# Patient Record
Sex: MALE | Race: BLACK OR AFRICAN AMERICAN | NOT HISPANIC OR LATINO | RURAL
[De-identification: names, ages, dates, MRNs, and addresses within clinical notes are randomized per-mention and may not be internally consistent; named-entity substitution may affect disease eponyms.]

---

## 2024-05-22 ENCOUNTER — HOSPITAL ENCOUNTER (EMERGENCY)
Facility: HOSPITAL | Age: 59
Discharge: HOME | End: 2024-05-22
Attending: EMERGENCY MEDICINE

## 2024-05-22 VITALS
OXYGEN SATURATION: 98 % | BODY MASS INDEX: 32.83 KG/M2 | WEIGHT: 264 LBS | HEART RATE: 93 BPM | HEIGHT: 75 IN | DIASTOLIC BLOOD PRESSURE: 89 MMHG | SYSTOLIC BLOOD PRESSURE: 128 MMHG | TEMPERATURE: 98 F | RESPIRATION RATE: 18 BRPM

## 2024-05-22 DIAGNOSIS — M62.838 MUSCLE SPASM: Primary | ICD-10-CM

## 2024-05-22 DIAGNOSIS — R52 PAIN: ICD-10-CM

## 2024-05-22 LAB
ALBUMIN SERPL BCP-MCNC: 3.7 G/DL (ref 3.5–5)
ALBUMIN/GLOB SERPL: 0.9 {RATIO}
ALP SERPL-CCNC: 150 U/L (ref 45–115)
ALT SERPL W P-5'-P-CCNC: 184 U/L (ref 16–61)
ANION GAP SERPL CALCULATED.3IONS-SCNC: 15 MMOL/L (ref 7–16)
AST SERPL W P-5'-P-CCNC: 76 U/L (ref 15–37)
BASOPHILS # BLD AUTO: 0.04 K/UL (ref 0–0.2)
BASOPHILS NFR BLD AUTO: 0.5 % (ref 0–1)
BASOPHILS NFR BLD MANUAL: 1 % (ref 0–1)
BILIRUB SERPL-MCNC: 0.5 MG/DL (ref ?–1.2)
BUN SERPL-MCNC: 15 MG/DL (ref 7–18)
BUN/CREAT SERPL: 12 (ref 6–20)
CALCIUM SERPL-MCNC: 9.6 MG/DL (ref 8.5–10.1)
CHLORIDE SERPL-SCNC: 103 MMOL/L (ref 98–107)
CO2 SERPL-SCNC: 25 MMOL/L (ref 21–32)
CREAT SERPL-MCNC: 1.24 MG/DL (ref 0.7–1.3)
DIFFERENTIAL METHOD BLD: ABNORMAL
EGFR (NO RACE VARIABLE) (RUSH/TITUS): 68 ML/MIN/1.73M2
EOSINOPHIL # BLD AUTO: 0.15 K/UL (ref 0–0.5)
EOSINOPHIL NFR BLD AUTO: 1.9 % (ref 1–4)
EOSINOPHIL NFR BLD MANUAL: 1 % (ref 1–4)
ERYTHROCYTE [DISTWIDTH] IN BLOOD BY AUTOMATED COUNT: 14.5 % (ref 11.5–14.5)
GLOBULIN SER-MCNC: 4.1 G/DL (ref 2–4)
GLUCOSE SERPL-MCNC: 283 MG/DL (ref 74–106)
HCT VFR BLD AUTO: 52.9 % (ref 40–54)
HGB BLD-MCNC: 16.5 G/DL (ref 13.5–18)
IMM GRANULOCYTES # BLD AUTO: 0.03 K/UL (ref 0–0.04)
IMM GRANULOCYTES NFR BLD: 0.4 % (ref 0–0.4)
LYMPHOCYTES # BLD AUTO: 2.21 K/UL (ref 1–4.8)
LYMPHOCYTES NFR BLD AUTO: 28.3 % (ref 27–41)
LYMPHOCYTES NFR BLD MANUAL: 30 % (ref 27–41)
MCH RBC QN AUTO: 25.2 PG (ref 27–31)
MCHC RBC AUTO-ENTMCNC: 31.2 G/DL (ref 32–36)
MCV RBC AUTO: 80.6 FL (ref 80–96)
MONOCYTES # BLD AUTO: 0.58 K/UL (ref 0–0.8)
MONOCYTES NFR BLD AUTO: 7.4 % (ref 2–6)
MONOCYTES NFR BLD MANUAL: 4 % (ref 2–6)
MPC BLD CALC-MCNC: ABNORMAL G/DL
NEUTROPHILS # BLD AUTO: 4.81 K/UL (ref 1.8–7.7)
NEUTROPHILS NFR BLD AUTO: 61.5 % (ref 53–65)
NEUTS BAND NFR BLD MANUAL: 1 % (ref 1–5)
NEUTS SEG NFR BLD MANUAL: 63 % (ref 50–62)
NRBC # BLD AUTO: 0 X10E3/UL
NRBC, AUTO (.00): 0 %
PLATELET # BLD AUTO: 124 K/UL (ref 150–400)
PLATELET MORPHOLOGY: ABNORMAL
POTASSIUM SERPL-SCNC: 4.6 MMOL/L (ref 3.5–5.1)
PROT SERPL-MCNC: 7.8 G/DL (ref 6.4–8.2)
RBC # BLD AUTO: 6.56 M/UL (ref 4.6–6.2)
RBC MORPH BLD: NORMAL
SODIUM SERPL-SCNC: 138 MMOL/L (ref 136–145)
WBC # BLD AUTO: 7.82 K/UL (ref 4.5–11)

## 2024-05-22 PROCEDURE — 36415 COLL VENOUS BLD VENIPUNCTURE: CPT | Performed by: EMERGENCY MEDICINE

## 2024-05-22 PROCEDURE — 99284 EMERGENCY DEPT VISIT MOD MDM: CPT | Mod: 25

## 2024-05-22 PROCEDURE — 80053 COMPREHEN METABOLIC PANEL: CPT | Performed by: EMERGENCY MEDICINE

## 2024-05-22 PROCEDURE — 85025 COMPLETE CBC W/AUTO DIFF WBC: CPT | Performed by: EMERGENCY MEDICINE

## 2024-05-22 RX ORDER — IBUPROFEN 600 MG/1
600 TABLET ORAL EVERY 6 HOURS PRN
Qty: 20 TABLET | Refills: 0 | Status: SHIPPED | OUTPATIENT
Start: 2024-05-22

## 2024-05-22 RX ORDER — CYCLOBENZAPRINE HCL 10 MG
10 TABLET ORAL 3 TIMES DAILY PRN
Qty: 30 TABLET | Refills: 0 | Status: SHIPPED | OUTPATIENT
Start: 2024-05-22 | End: 2024-06-01

## 2024-05-22 NOTE — Clinical Note
"Adam"Miguel Parsons was seen and treated in our emergency department on 5/22/2024.  He may return to work on 05/23/2024.       If you have any questions or concerns, please don't hesitate to call.      Jerson MORALES    "

## 2024-05-22 NOTE — ED PROVIDER NOTES
Encounter Date: 5/22/2024       History     Chief Complaint   Patient presents with    Cough     57 Y/O MALE WITH RIGHT CHEST WALL / RIB PAIN THAT STARTED AFTER A COUGHING EPISODE.  HE SAYS PAIN IS MODERATE, BUT IT IS WORSE WITH DEEP BREATHS, PALPATION, AND PARTICULAR MOVEMENTS.        Review of patient's allergies indicates:  No Known Allergies  History reviewed. No pertinent past medical history.  History reviewed. No pertinent surgical history.  No family history on file.  Social History     Tobacco Use    Smoking status: Every Day     Current packs/day: 1.00     Types: Cigarettes    Smokeless tobacco: Never   Substance Use Topics    Alcohol use: Never    Drug use: Never     Review of Systems   All other systems reviewed and are negative.      Physical Exam     Initial Vitals [05/22/24 0722]   BP Pulse Resp Temp SpO2   (!) 161/96 104 17 98.1 °F (36.7 °C) 98 %      MAP       --         Physical Exam    Nursing note and vitals reviewed.  Constitutional: He appears well-developed and well-nourished.   HENT:   Head: Normocephalic and atraumatic.   Nose: Nose normal.   Mouth/Throat: Oropharynx is clear and moist.   Eyes: Conjunctivae and EOM are normal. Pupils are equal, round, and reactive to light.   Neck: Neck supple.   Normal range of motion.  Cardiovascular:  Normal rate, regular rhythm and normal heart sounds.           Pulmonary/Chest: Breath sounds normal.   Abdominal: Abdomen is soft. Bowel sounds are normal.   Musculoskeletal:         General: Normal range of motion.      Cervical back: Normal range of motion and neck supple.     Neurological: He is alert and oriented to person, place, and time. He has normal strength. GCS score is 15. GCS eye subscore is 4. GCS verbal subscore is 5. GCS motor subscore is 6.   Skin: Skin is warm and dry. Capillary refill takes less than 2 seconds.   Psychiatric: He has a normal mood and affect.         Medical Screening Exam   See Full Note    ED Course   Procedures  Labs  Reviewed   COMPREHENSIVE METABOLIC PANEL - Abnormal; Notable for the following components:       Result Value    Glucose 283 (*)     Globulin 4.1 (*)     Alk Phos 150 (*)      (*)     AST 76 (*)     All other components within normal limits   CBC WITH DIFFERENTIAL - Abnormal; Notable for the following components:    RBC 6.56 (*)     MCH 25.2 (*)     MCHC 31.2 (*)     Platelet Count 124 (*)     Monocytes % 7.4 (*)     All other components within normal limits   MANUAL DIFFERENTIAL - Abnormal; Notable for the following components:    Segmented Neutrophils, Man % 63 (*)     Platelet Morphology Few Large Platelets (*)     All other components within normal limits   CBC W/ AUTO DIFFERENTIAL    Narrative:     The following orders were created for panel order CBC auto differential.  Procedure                               Abnormality         Status                     ---------                               -----------         ------                     CBC with Differential[[id number]]       Abnormal            Edited Result - FINAL      Manual Differential[[id number]]         Abnormal            Final result                 Please view results for these tests on the individual orders.          Imaging Results              X-Ray Chest AP Portable (Final result)  Result time 05/22/24 07:40:54      Final result by Daren Eaton II, MD (05/22/24 07:40:54)                   Impression:      No evidence of cardiopulmonary disease.      Electronically signed by: Daren Eaton  Date:    05/22/2024  Time:    07:40               Narrative:    EXAMINATION:  XR CHEST AP PORTABLE    CLINICAL HISTORY:  Pain, unspecified    COMPARISON:  None available    TECHNIQUE:  XR CHEST AP PORTABLE    FINDINGS:  The heart and mediastinum are normal in size and configuration.  The pulmonary vascularity is normal in caliber.  No lung infiltrates, effusions, pneumothorax or other abnormality is demonstrated.                                        Medications - No data to display  Medical Decision Making  Amount and/or Complexity of Data Reviewed  Labs: ordered.  Radiology: ordered.                                      Clinical Impression:   Final diagnoses:  [R52] Pain  [M62.838] Muscle spasm (Primary)        ED Disposition Condition    Discharge Stable          ED Prescriptions    None       Follow-up Information       Follow up With Specialties Details Why Contact Info    [facility] - Emergency Department Emergency Medicine  As needed [address] [place] [id number] [phone]             Grabiel Landa MD  05/22/24 2926

## 2024-08-01 ENCOUNTER — HOSPITAL ENCOUNTER (INPATIENT)
Facility: HOSPITAL | Age: 59
LOS: 1 days | Discharge: HOME | DRG: 322 | End: 2024-08-03
Attending: EMERGENCY MEDICINE | Admitting: INTERNAL MEDICINE

## 2024-08-01 DIAGNOSIS — Z79.4 TYPE 2 DIABETES MELLITUS WITH DIABETIC PERIPHERAL ANGIOPATHY WITHOUT GANGRENE, WITH LONG-TERM CURRENT USE OF INSULIN: ICD-10-CM

## 2024-08-01 DIAGNOSIS — R29.818 SUSPECTED SLEEP APNEA: ICD-10-CM

## 2024-08-01 DIAGNOSIS — I21.4 NSTEMI (NON-ST ELEVATED MYOCARDIAL INFARCTION): Primary | ICD-10-CM

## 2024-08-01 DIAGNOSIS — R07.9 CHEST PAIN: ICD-10-CM

## 2024-08-01 DIAGNOSIS — I73.9 PVD (PERIPHERAL VASCULAR DISEASE) WITH CLAUDICATION: ICD-10-CM

## 2024-08-01 DIAGNOSIS — F17.200 TOBACCO DEPENDENCY: ICD-10-CM

## 2024-08-01 DIAGNOSIS — E11.51 TYPE 2 DIABETES MELLITUS WITH DIABETIC PERIPHERAL ANGIOPATHY WITHOUT GANGRENE, WITH LONG-TERM CURRENT USE OF INSULIN: ICD-10-CM

## 2024-08-01 LAB
APTT PPP: 27.2 SECONDS (ref 25.2–37.3)
INR BLD: 1.06
PROTHROMBIN TIME: 13.7 SECONDS (ref 11.7–14.7)

## 2024-08-01 PROCEDURE — 93005 ELECTROCARDIOGRAM TRACING: CPT

## 2024-08-01 PROCEDURE — 99285 EMERGENCY DEPT VISIT HI MDM: CPT | Mod: 25

## 2024-08-01 PROCEDURE — 93010 ELECTROCARDIOGRAM REPORT: CPT | Mod: ,,, | Performed by: INTERNAL MEDICINE

## 2024-08-01 PROCEDURE — 36415 COLL VENOUS BLD VENIPUNCTURE: CPT | Performed by: EMERGENCY MEDICINE

## 2024-08-01 PROCEDURE — 82962 GLUCOSE BLOOD TEST: CPT

## 2024-08-01 PROCEDURE — 84484 ASSAY OF TROPONIN QUANT: CPT | Performed by: EMERGENCY MEDICINE

## 2024-08-01 PROCEDURE — 85025 COMPLETE CBC W/AUTO DIFF WBC: CPT | Performed by: EMERGENCY MEDICINE

## 2024-08-01 PROCEDURE — 83880 ASSAY OF NATRIURETIC PEPTIDE: CPT | Performed by: EMERGENCY MEDICINE

## 2024-08-01 PROCEDURE — 80053 COMPREHEN METABOLIC PANEL: CPT | Performed by: EMERGENCY MEDICINE

## 2024-08-01 PROCEDURE — 85730 THROMBOPLASTIN TIME PARTIAL: CPT | Performed by: EMERGENCY MEDICINE

## 2024-08-01 PROCEDURE — 85610 PROTHROMBIN TIME: CPT | Performed by: EMERGENCY MEDICINE

## 2024-08-01 PROCEDURE — 83735 ASSAY OF MAGNESIUM: CPT | Performed by: EMERGENCY MEDICINE

## 2024-08-01 PROCEDURE — 36415 COLL VENOUS BLD VENIPUNCTURE: CPT | Performed by: HOSPITALIST

## 2024-08-02 PROBLEM — E66.9 OBESITY (BMI 30-39.9): Status: ACTIVE | Noted: 2024-08-02

## 2024-08-02 PROBLEM — R73.9 HYPERGLYCEMIA: Status: ACTIVE | Noted: 2024-08-02

## 2024-08-02 PROBLEM — I21.4 NSTEMI (NON-ST ELEVATED MYOCARDIAL INFARCTION): Status: ACTIVE | Noted: 2024-08-02

## 2024-08-02 PROBLEM — F17.200 TOBACCO DEPENDENCY: Status: ACTIVE | Noted: 2024-08-02

## 2024-08-02 LAB
ABORH RETYPE: NORMAL
ALBUMIN SERPL BCP-MCNC: 3.7 G/DL (ref 3.5–5)
ALBUMIN/GLOB SERPL: 1 {RATIO}
ALP SERPL-CCNC: 157 U/L (ref 45–115)
ALT SERPL W P-5'-P-CCNC: 156 U/L (ref 16–61)
AMPHET UR QL SCN: NEGATIVE
ANION GAP SERPL CALCULATED.3IONS-SCNC: 12 MMOL/L (ref 7–16)
ANION GAP SERPL CALCULATED.3IONS-SCNC: 12 MMOL/L (ref 7–16)
AORTIC ROOT ANNULUS: 3.08 CM
AORTIC VALVE CUSP SEPERATION: 2.28 CM
APICAL FOUR CHAMBER EJECTION FRACTION: 62 %
AST SERPL W P-5'-P-CCNC: 97 U/L (ref 15–37)
AV INDEX (PROSTH): 0.6
AV MEAN GRADIENT: 3 MMHG
AV PEAK GRADIENT: 6 MMHG
AV VALVE AREA BY VELOCITY RATIO: 3.27 CM²
AV VALVE AREA: 2.47 CM²
AV VELOCITY RATIO: 0.79
BARBITURATES UR QL SCN: NEGATIVE
BASOPHILS # BLD AUTO: 0.03 K/UL (ref 0–0.2)
BASOPHILS NFR BLD AUTO: 0.7 % (ref 0–1)
BENZODIAZ METAB UR QL SCN: NEGATIVE
BILIRUB SERPL-MCNC: 1.7 MG/DL (ref ?–1.2)
BILIRUB UR QL STRIP: NEGATIVE
BSA FOR ECHO PROCEDURE: 2.45 M2
BUN SERPL-MCNC: 17 MG/DL (ref 7–18)
BUN SERPL-MCNC: 21 MG/DL (ref 7–18)
BUN/CREAT SERPL: 12 (ref 6–20)
BUN/CREAT SERPL: 16 (ref 6–20)
CALCIUM SERPL-MCNC: 9.2 MG/DL (ref 8.5–10.1)
CALCIUM SERPL-MCNC: 9.3 MG/DL (ref 8.5–10.1)
CANNABINOIDS UR QL SCN: NEGATIVE
CHLORIDE SERPL-SCNC: 107 MMOL/L (ref 98–107)
CHLORIDE SERPL-SCNC: 98 MMOL/L (ref 98–107)
CHOLEST SERPL-MCNC: 179 MG/DL (ref 0–200)
CHOLEST/HDLC SERPL: 9.4 {RATIO}
CLARITY UR: CLEAR
CO2 SERPL-SCNC: 21 MMOL/L (ref 21–32)
CO2 SERPL-SCNC: 24 MMOL/L (ref 21–32)
COCAINE UR QL SCN: NEGATIVE
COLOR UR: COLORLESS
CREAT SERPL-MCNC: 1.28 MG/DL (ref 0.7–1.3)
CREAT SERPL-MCNC: 1.42 MG/DL (ref 0.7–1.3)
CV ECHO LV RWT: 1.02 CM
D DIMER PPP FEU-MCNC: 0.36 ΜG/ML (ref 0–0.47)
DIFFERENTIAL METHOD BLD: ABNORMAL
DOP CALC AO PEAK VEL: 1.21 M/S
DOP CALC AO VTI: 23.2 CM
DOP CALC LVOT AREA: 4.1 CM2
DOP CALC LVOT DIAMETER: 2.29 CM
DOP CALC LVOT PEAK VEL: 0.96 M/S
DOP CALC LVOT STROKE VOLUME: 57.22 CM3
DOP CALCLVOT PEAK VEL VTI: 13.9 CM
E WAVE DECELERATION TIME: 133.68 MSEC
E/A RATIO: 0.68
E/E' RATIO: 6.63 M/S
ECHO LV POSTERIOR WALL: 1.78 CM (ref 0.6–1.1)
EGFR (NO RACE VARIABLE) (RUSH/TITUS): 58 ML/MIN/1.73M2
EGFR (NO RACE VARIABLE) (RUSH/TITUS): 66 ML/MIN/1.73M2
EJECTION FRACTION: 65 %
EOSINOPHIL # BLD AUTO: 0.05 K/UL (ref 0–0.5)
EOSINOPHIL NFR BLD AUTO: 1.1 % (ref 1–4)
ERYTHROCYTE [DISTWIDTH] IN BLOOD BY AUTOMATED COUNT: 13.6 % (ref 11.5–14.5)
EST. AVERAGE GLUCOSE BLD GHB EST-MCNC: 301 MG/DL
FRACTIONAL SHORTENING: 23 % (ref 28–44)
GLOBULIN SER-MCNC: 3.7 G/DL (ref 2–4)
GLUCOSE SERPL-MCNC: 204 MG/DL (ref 70–105)
GLUCOSE SERPL-MCNC: 222 MG/DL (ref 70–105)
GLUCOSE SERPL-MCNC: 335 MG/DL (ref 70–105)
GLUCOSE SERPL-MCNC: 360 MG/DL (ref 70–105)
GLUCOSE SERPL-MCNC: 417 MG/DL (ref 74–106)
GLUCOSE SERPL-MCNC: 584 MG/DL (ref 74–106)
GLUCOSE UR STRIP-MCNC: >1000 MG/DL
HBA1C MFR BLD HPLC: 12.1 % (ref 4.5–6.6)
HCT VFR BLD AUTO: 50.7 % (ref 40–54)
HDLC SERPL-MCNC: 19 MG/DL (ref 40–60)
HGB BLD-MCNC: 15.9 G/DL (ref 13.5–18)
IMM GRANULOCYTES # BLD AUTO: 0.02 K/UL (ref 0–0.04)
IMM GRANULOCYTES NFR BLD: 0.5 % (ref 0–0.4)
INDIRECT COOMBS: NORMAL
INTERVENTRICULAR SEPTUM: 1.89 CM (ref 0.6–1.1)
IVC DIAMETER: 1.44 CM
KETONES UR STRIP-SCNC: NEGATIVE MG/DL
LEFT ATRIUM AREA SYSTOLIC (APICAL 4 CHAMBER): 12.54 CM2
LEFT ATRIUM SIZE: 3.14 CM
LEFT INTERNAL DIMENSION IN SYSTOLE: 2.7 CM (ref 2.1–4)
LEFT VENTRICLE DIASTOLIC VOLUME INDEX: 20.58 ML/M2
LEFT VENTRICLE DIASTOLIC VOLUME: 49.59 ML
LEFT VENTRICLE END DIASTOLIC VOLUME APICAL 4 CHAMBER: 59.41 ML
LEFT VENTRICLE END SYSTOLIC VOLUME APICAL 4 CHAMBER: 28.02 ML
LEFT VENTRICLE MASS INDEX: 113 G/M2
LEFT VENTRICLE SYSTOLIC VOLUME INDEX: 11.2 ML/M2
LEFT VENTRICLE SYSTOLIC VOLUME: 26.9 ML
LEFT VENTRICULAR INTERNAL DIMENSION IN DIASTOLE: 3.5 CM (ref 3.5–6)
LEFT VENTRICULAR MASS: 271.6 G
LEUKOCYTE ESTERASE UR QL STRIP: NEGATIVE
LV LATERAL E/E' RATIO: 5.3 M/S
LV SEPTAL E/E' RATIO: 8.83 M/S
LVED V (TEICH): 49.59 ML
LVES V (TEICH): 26.9 ML
LVOT MG: 1.48 MMHG
LVOT MV: 0.56 CM/S
LYMPHOCYTES # BLD AUTO: 1.29 K/UL (ref 1–4.8)
LYMPHOCYTES NFR BLD AUTO: 29.6 % (ref 27–41)
MAGNESIUM SERPL-MCNC: 2.1 MG/DL (ref 1.7–2.3)
MCH RBC QN AUTO: 24.8 PG (ref 27–31)
MCHC RBC AUTO-ENTMCNC: 31.4 G/DL (ref 32–36)
MCV RBC AUTO: 79.2 FL (ref 80–96)
MONOCYTES # BLD AUTO: 0.42 K/UL (ref 0–0.8)
MONOCYTES NFR BLD AUTO: 9.6 % (ref 2–6)
MPC BLD CALC-MCNC: ABNORMAL G/DL
MV PEAK A VEL: 0.78 M/S
MV PEAK E VEL: 0.53 M/S
MV STENOSIS PRESSURE HALF TIME: 38.77 MS
MV VALVE AREA P 1/2 METHOD: 5.67 CM2
NEUTROPHILS # BLD AUTO: 2.55 K/UL (ref 1.8–7.7)
NEUTROPHILS NFR BLD AUTO: 58.5 % (ref 53–65)
NITRITE UR QL STRIP: NEGATIVE
NONHDLC SERPL-MCNC: 160 MG/DL
NRBC # BLD AUTO: 0 X10E3/UL
NRBC, AUTO (.00): 0 %
NT-PROBNP SERPL-MCNC: 36 PG/ML (ref 1–125)
OPIATES UR QL SCN: NEGATIVE
PCP UR QL SCN: NEGATIVE
PH UR STRIP: 5.5 PH UNITS
PLATELET # BLD AUTO: 127 K/UL (ref 150–400)
PLATELET MORPHOLOGY: ABNORMAL
POTASSIUM SERPL-SCNC: 4.1 MMOL/L (ref 3.5–5.1)
POTASSIUM SERPL-SCNC: 4.7 MMOL/L (ref 3.5–5.1)
PROT SERPL-MCNC: 7.4 G/DL (ref 6.4–8.2)
PROT UR QL STRIP: NEGATIVE
PV PEAK GRADIENT: 4 MMHG
PV PEAK VELOCITY: 1.03 M/S
RA MAJOR: 3.72 CM
RA PRESSURE ESTIMATED: 3 MMHG
RBC # BLD AUTO: 6.4 M/UL (ref 4.6–6.2)
RBC # UR STRIP: NEGATIVE /UL
RBC MORPH BLD: NORMAL
RH BLD: NORMAL
RIGHT VENTRICLE DIASTOLIC BASEL DIMENSION: 3.4 CM
RIGHT VENTRICLE DIASTOLIC LENGTH: 7.1 CM
RIGHT VENTRICLE DIASTOLIC MID DIMENSION: 2.3 CM
RIGHT VENTRICULAR LENGTH IN DIASTOLE (APICAL 4-CHAMBER VIEW): 7.09 CM
RV MID DIAMA: 2.32 CM
SARS-COV-2 RDRP RESP QL NAA+PROBE: NEGATIVE
SODIUM SERPL-SCNC: 130 MMOL/L (ref 136–145)
SODIUM SERPL-SCNC: 135 MMOL/L (ref 136–145)
SP GR UR STRIP: 1.03
SPECIMEN OUTDATE: NORMAL
TDI LATERAL: 0.1 M/S
TDI SEPTAL: 0.06 M/S
TDI: 0.08 M/S
TRICUSPID ANNULAR PLANE SYSTOLIC EXCURSION: 1.91 CM
TRIGL SERPL-MCNC: 833 MG/DL (ref 35–150)
TROPONIN I SERPL DL<=0.01 NG/ML-MCNC: 148.3 PG/ML
TROPONIN I SERPL DL<=0.01 NG/ML-MCNC: 42.1 PG/ML
TROPONIN I SERPL DL<=0.01 NG/ML-MCNC: 90.3 PG/ML
UROBILINOGEN UR STRIP-ACNC: NORMAL MG/DL
VLDLC SERPL-MCNC: ABNORMAL MG/DL
WBC # BLD AUTO: 4.36 K/UL (ref 4.5–11)
Z-SCORE OF LEFT VENTRICULAR DIMENSION IN END DIASTOLE: -11.62
Z-SCORE OF LEFT VENTRICULAR DIMENSION IN END SYSTOLE: -7.17

## 2024-08-02 PROCEDURE — 027034Z DILATION OF CORONARY ARTERY, ONE ARTERY WITH DRUG-ELUTING INTRALUMINAL DEVICE, PERCUTANEOUS APPROACH: ICD-10-PCS | Performed by: HOSPITALIST

## 2024-08-02 PROCEDURE — 25500020 PHARM REV CODE 255: Performed by: HOSPITALIST

## 2024-08-02 PROCEDURE — 25000003 PHARM REV CODE 250: Performed by: EMERGENCY MEDICINE

## 2024-08-02 PROCEDURE — 11000001 HC ACUTE MED/SURG PRIVATE ROOM

## 2024-08-02 PROCEDURE — 36415 COLL VENOUS BLD VENIPUNCTURE: CPT | Performed by: NURSE PRACTITIONER

## 2024-08-02 PROCEDURE — 86900 BLOOD TYPING SEROLOGIC ABO: CPT | Performed by: INTERNAL MEDICINE

## 2024-08-02 PROCEDURE — 25000003 PHARM REV CODE 250: Performed by: INTERNAL MEDICINE

## 2024-08-02 PROCEDURE — C1887 CATHETER, GUIDING: HCPCS | Performed by: HOSPITALIST

## 2024-08-02 PROCEDURE — 80061 LIPID PANEL: CPT | Performed by: INTERNAL MEDICINE

## 2024-08-02 PROCEDURE — 94761 N-INVAS EAR/PLS OXIMETRY MLT: CPT

## 2024-08-02 PROCEDURE — B2151ZZ FLUOROSCOPY OF LEFT HEART USING LOW OSMOLAR CONTRAST: ICD-10-PCS | Performed by: HOSPITALIST

## 2024-08-02 PROCEDURE — 84484 ASSAY OF TROPONIN QUANT: CPT | Performed by: EMERGENCY MEDICINE

## 2024-08-02 PROCEDURE — 96374 THER/PROPH/DIAG INJ IV PUSH: CPT

## 2024-08-02 PROCEDURE — C1769 GUIDE WIRE: HCPCS | Performed by: HOSPITALIST

## 2024-08-02 PROCEDURE — 36415 COLL VENOUS BLD VENIPUNCTURE: CPT | Performed by: INTERNAL MEDICINE

## 2024-08-02 PROCEDURE — 93005 ELECTROCARDIOGRAM TRACING: CPT

## 2024-08-02 PROCEDURE — 63600175 PHARM REV CODE 636 W HCPCS: Performed by: HOSPITALIST

## 2024-08-02 PROCEDURE — 80307 DRUG TEST PRSMV CHEM ANLYZR: CPT | Performed by: EMERGENCY MEDICINE

## 2024-08-02 PROCEDURE — 99900035 HC TECH TIME PER 15 MIN (STAT)

## 2024-08-02 PROCEDURE — 83036 HEMOGLOBIN GLYCOSYLATED A1C: CPT | Performed by: INTERNAL MEDICINE

## 2024-08-02 PROCEDURE — 87635 SARS-COV-2 COVID-19 AMP PRB: CPT | Performed by: INTERNAL MEDICINE

## 2024-08-02 PROCEDURE — 25000003 PHARM REV CODE 250: Performed by: HOSPITALIST

## 2024-08-02 PROCEDURE — 80048 BASIC METABOLIC PNL TOTAL CA: CPT | Performed by: INTERNAL MEDICINE

## 2024-08-02 PROCEDURE — 96361 HYDRATE IV INFUSION ADD-ON: CPT

## 2024-08-02 PROCEDURE — 85379 FIBRIN DEGRADATION QUANT: CPT | Performed by: NURSE PRACTITIONER

## 2024-08-02 PROCEDURE — 93010 ELECTROCARDIOGRAM REPORT: CPT | Mod: ,,, | Performed by: INTERNAL MEDICINE

## 2024-08-02 PROCEDURE — C1894 INTRO/SHEATH, NON-LASER: HCPCS | Performed by: HOSPITALIST

## 2024-08-02 PROCEDURE — 99153 MOD SED SAME PHYS/QHP EA: CPT | Performed by: HOSPITALIST

## 2024-08-02 PROCEDURE — C1874 STENT, COATED/COV W/DEL SYS: HCPCS | Performed by: HOSPITALIST

## 2024-08-02 PROCEDURE — 92928 PRQ TCAT PLMT NTRAC ST 1 LES: CPT | Mod: LD,,, | Performed by: HOSPITALIST

## 2024-08-02 PROCEDURE — 63600175 PHARM REV CODE 636 W HCPCS: Performed by: EMERGENCY MEDICINE

## 2024-08-02 PROCEDURE — 93458 L HRT ARTERY/VENTRICLE ANGIO: CPT | Mod: 26,XU,51, | Performed by: HOSPITALIST

## 2024-08-02 PROCEDURE — C1725 CATH, TRANSLUMIN NON-LASER: HCPCS | Performed by: HOSPITALIST

## 2024-08-02 PROCEDURE — 82962 GLUCOSE BLOOD TEST: CPT

## 2024-08-02 PROCEDURE — 4A023N7 MEASUREMENT OF CARDIAC SAMPLING AND PRESSURE, LEFT HEART, PERCUTANEOUS APPROACH: ICD-10-PCS | Performed by: HOSPITALIST

## 2024-08-02 PROCEDURE — C9600 PERC DRUG-EL COR STENT SING: HCPCS | Mod: LD | Performed by: HOSPITALIST

## 2024-08-02 PROCEDURE — 99152 MOD SED SAME PHYS/QHP 5/>YRS: CPT | Mod: ,,, | Performed by: HOSPITALIST

## 2024-08-02 PROCEDURE — B2111ZZ FLUOROSCOPY OF MULTIPLE CORONARY ARTERIES USING LOW OSMOLAR CONTRAST: ICD-10-PCS | Performed by: HOSPITALIST

## 2024-08-02 PROCEDURE — 63600175 PHARM REV CODE 636 W HCPCS: Performed by: INTERNAL MEDICINE

## 2024-08-02 PROCEDURE — 99233 SBSQ HOSP IP/OBS HIGH 50: CPT | Mod: ,,, | Performed by: HOSPITALIST

## 2024-08-02 PROCEDURE — 93458 L HRT ARTERY/VENTRICLE ANGIO: CPT | Mod: XU | Performed by: HOSPITALIST

## 2024-08-02 PROCEDURE — 36415 COLL VENOUS BLD VENIPUNCTURE: CPT | Performed by: EMERGENCY MEDICINE

## 2024-08-02 PROCEDURE — 99223 1ST HOSP IP/OBS HIGH 75: CPT | Mod: ,,, | Performed by: INTERNAL MEDICINE

## 2024-08-02 PROCEDURE — 99152 MOD SED SAME PHYS/QHP 5/>YRS: CPT | Performed by: HOSPITALIST

## 2024-08-02 PROCEDURE — 86850 RBC ANTIBODY SCREEN: CPT | Performed by: INTERNAL MEDICINE

## 2024-08-02 PROCEDURE — 81003 URINALYSIS AUTO W/O SCOPE: CPT | Mod: 59 | Performed by: EMERGENCY MEDICINE

## 2024-08-02 PROCEDURE — 27201423 OPTIME MED/SURG SUP & DEVICES STERILE SUPPLY: Performed by: HOSPITALIST

## 2024-08-02 PROCEDURE — 84484 ASSAY OF TROPONIN QUANT: CPT | Performed by: NURSE PRACTITIONER

## 2024-08-02 DEVICE — EVEROLIMUS-ELUTING PLATINUM CHROMIUM CORONARY STENT SYSTEM
Type: IMPLANTABLE DEVICE | Site: CORONARY | Status: FUNCTIONAL
Brand: SYNERGY™ XD

## 2024-08-02 RX ORDER — NICOTINE 7MG/24HR
1 PATCH, TRANSDERMAL 24 HOURS TRANSDERMAL DAILY
Status: DISCONTINUED | OUTPATIENT
Start: 2024-08-03 | End: 2024-08-03 | Stop reason: HOSPADM

## 2024-08-02 RX ORDER — PROCHLORPERAZINE EDISYLATE 5 MG/ML
5 INJECTION INTRAMUSCULAR; INTRAVENOUS EVERY 6 HOURS PRN
Status: DISCONTINUED | OUTPATIENT
Start: 2024-08-02 | End: 2024-08-03 | Stop reason: HOSPADM

## 2024-08-02 RX ORDER — GLUCAGON 1 MG
1 KIT INJECTION
Status: DISCONTINUED | OUTPATIENT
Start: 2024-08-02 | End: 2024-08-03 | Stop reason: HOSPADM

## 2024-08-02 RX ORDER — ATORVASTATIN CALCIUM 80 MG/1
80 TABLET, FILM COATED ORAL DAILY
Status: DISCONTINUED | OUTPATIENT
Start: 2024-08-02 | End: 2024-08-03 | Stop reason: HOSPADM

## 2024-08-02 RX ORDER — ASPIRIN 81 MG/1
81 TABLET ORAL DAILY
Status: DISCONTINUED | OUTPATIENT
Start: 2024-08-03 | End: 2024-08-03 | Stop reason: HOSPADM

## 2024-08-02 RX ORDER — ASPIRIN 325 MG
325 TABLET ORAL DAILY
Status: COMPLETED | OUTPATIENT
Start: 2024-08-02 | End: 2024-08-02

## 2024-08-02 RX ORDER — NITROGLYCERIN 0.4 MG/1
0.4 TABLET SUBLINGUAL EVERY 5 MIN PRN
Status: DISCONTINUED | OUTPATIENT
Start: 2024-08-02 | End: 2024-08-03 | Stop reason: HOSPADM

## 2024-08-02 RX ORDER — METOPROLOL TARTRATE 25 MG/1
12.5 TABLET ORAL 2 TIMES DAILY
Status: DISCONTINUED | OUTPATIENT
Start: 2024-08-02 | End: 2024-08-02

## 2024-08-02 RX ORDER — SODIUM CHLORIDE 0.9 % (FLUSH) 0.9 %
10 SYRINGE (ML) INJECTION
Status: DISCONTINUED | OUTPATIENT
Start: 2024-08-02 | End: 2024-08-03 | Stop reason: HOSPADM

## 2024-08-02 RX ORDER — LABETALOL HYDROCHLORIDE 5 MG/ML
10 INJECTION, SOLUTION INTRAVENOUS EVERY 4 HOURS PRN
Status: DISCONTINUED | OUTPATIENT
Start: 2024-08-02 | End: 2024-08-03 | Stop reason: HOSPADM

## 2024-08-02 RX ORDER — ACETAMINOPHEN 325 MG/1
650 TABLET ORAL EVERY 4 HOURS PRN
Status: DISCONTINUED | OUTPATIENT
Start: 2024-08-02 | End: 2024-08-03 | Stop reason: HOSPADM

## 2024-08-02 RX ORDER — LABETALOL HYDROCHLORIDE 5 MG/ML
INJECTION, SOLUTION INTRAVENOUS
Status: DISCONTINUED | OUTPATIENT
Start: 2024-08-02 | End: 2024-08-02 | Stop reason: HOSPADM

## 2024-08-02 RX ORDER — INSULIN ASPART 100 [IU]/ML
0-10 INJECTION, SOLUTION INTRAVENOUS; SUBCUTANEOUS EVERY 6 HOURS PRN
Status: DISCONTINUED | OUTPATIENT
Start: 2024-08-02 | End: 2024-08-03 | Stop reason: HOSPADM

## 2024-08-02 RX ORDER — LIDOCAINE HYDROCHLORIDE 10 MG/ML
INJECTION, SOLUTION INFILTRATION; PERINEURAL
Status: DISCONTINUED | OUTPATIENT
Start: 2024-08-02 | End: 2024-08-02 | Stop reason: HOSPADM

## 2024-08-02 RX ORDER — VERAPAMIL HYDROCHLORIDE 2.5 MG/ML
INJECTION, SOLUTION INTRAVENOUS
Status: DISCONTINUED | OUTPATIENT
Start: 2024-08-02 | End: 2024-08-02 | Stop reason: HOSPADM

## 2024-08-02 RX ORDER — SODIUM CHLORIDE 9 MG/ML
INJECTION, SOLUTION INTRAVENOUS CONTINUOUS
Status: ACTIVE | OUTPATIENT
Start: 2024-08-02 | End: 2024-08-02

## 2024-08-02 RX ORDER — IOPAMIDOL 755 MG/ML
INJECTION, SOLUTION INTRAVASCULAR
Status: DISCONTINUED | OUTPATIENT
Start: 2024-08-02 | End: 2024-08-02 | Stop reason: HOSPADM

## 2024-08-02 RX ORDER — MIDAZOLAM HYDROCHLORIDE 1 MG/ML
INJECTION INTRAMUSCULAR; INTRAVENOUS
Status: DISCONTINUED | OUTPATIENT
Start: 2024-08-02 | End: 2024-08-02 | Stop reason: HOSPADM

## 2024-08-02 RX ORDER — FENTANYL CITRATE 50 UG/ML
INJECTION, SOLUTION INTRAMUSCULAR; INTRAVENOUS
Status: DISCONTINUED | OUTPATIENT
Start: 2024-08-02 | End: 2024-08-02 | Stop reason: HOSPADM

## 2024-08-02 RX ORDER — LOSARTAN POTASSIUM 25 MG/1
25 TABLET ORAL DAILY
Status: DISCONTINUED | OUTPATIENT
Start: 2024-08-02 | End: 2024-08-03

## 2024-08-02 RX ORDER — SODIUM CHLORIDE 9 MG/ML
INJECTION, SOLUTION INTRAVENOUS CONTINUOUS
Status: DISCONTINUED | OUTPATIENT
Start: 2024-08-02 | End: 2024-08-02

## 2024-08-02 RX ORDER — HEPARIN SODIUM 5000 [USP'U]/ML
INJECTION, SOLUTION INTRAVENOUS; SUBCUTANEOUS
Status: DISCONTINUED | OUTPATIENT
Start: 2024-08-02 | End: 2024-08-02 | Stop reason: HOSPADM

## 2024-08-02 RX ORDER — ONDANSETRON HYDROCHLORIDE 2 MG/ML
4 INJECTION, SOLUTION INTRAVENOUS EVERY 8 HOURS PRN
Status: DISCONTINUED | OUTPATIENT
Start: 2024-08-02 | End: 2024-08-03 | Stop reason: HOSPADM

## 2024-08-02 RX ORDER — METOPROLOL TARTRATE 1 MG/ML
5 INJECTION, SOLUTION INTRAVENOUS
Status: COMPLETED | OUTPATIENT
Start: 2024-08-02 | End: 2024-08-02

## 2024-08-02 RX ORDER — NITROGLYCERIN 5 MG/ML
INJECTION, SOLUTION INTRAVENOUS
Status: DISCONTINUED | OUTPATIENT
Start: 2024-08-02 | End: 2024-08-02 | Stop reason: HOSPADM

## 2024-08-02 RX ORDER — HEPARIN SODIUM,PORCINE/D5W 25000/250
0-40 INTRAVENOUS SOLUTION INTRAVENOUS CONTINUOUS
Status: DISCONTINUED | OUTPATIENT
Start: 2024-08-02 | End: 2024-08-02

## 2024-08-02 RX ADMIN — HUMAN INSULIN 10 UNITS: 100 INJECTION, SOLUTION SUBCUTANEOUS at 02:08

## 2024-08-02 RX ADMIN — TICAGRELOR 90 MG: 90 TABLET ORAL at 09:08

## 2024-08-02 RX ADMIN — METOPROLOL TARTRATE 12.5 MG: 25 TABLET, FILM COATED ORAL at 08:08

## 2024-08-02 RX ADMIN — ATORVASTATIN CALCIUM 80 MG: 80 TABLET, FILM COATED ORAL at 08:08

## 2024-08-02 RX ADMIN — METOPROLOL TARTRATE 5 MG: 1 INJECTION, SOLUTION INTRAVENOUS at 12:08

## 2024-08-02 RX ADMIN — TICAGRELOR 180 MG: 90 TABLET ORAL at 06:08

## 2024-08-02 RX ADMIN — LOSARTAN POTASSIUM 25 MG: 25 TABLET, FILM COATED ORAL at 08:08

## 2024-08-02 RX ADMIN — ASPIRIN 325 MG ORAL TABLET 325 MG: 325 PILL ORAL at 08:08

## 2024-08-02 RX ADMIN — INSULIN ASPART 10 UNITS: 100 INJECTION, SOLUTION INTRAVENOUS; SUBCUTANEOUS at 06:08

## 2024-08-02 RX ADMIN — SODIUM CHLORIDE 1000 ML: 9 INJECTION, SOLUTION INTRAVENOUS at 06:08

## 2024-08-02 RX ADMIN — SODIUM CHLORIDE 1000 ML: 9 INJECTION, SOLUTION INTRAVENOUS at 02:08

## 2024-08-02 RX ADMIN — SODIUM CHLORIDE: 9 INJECTION, SOLUTION INTRAVENOUS at 06:08

## 2024-08-03 PROBLEM — I10 ESSENTIAL HYPERTENSION: Status: ACTIVE | Noted: 2024-08-03

## 2024-08-03 PROBLEM — E78.1 TYPE 2 DIABETES MELLITUS WITH HYPERTRIGLYCERIDEMIA: Status: ACTIVE | Noted: 2024-08-03

## 2024-08-03 PROBLEM — I73.9 PVD (PERIPHERAL VASCULAR DISEASE) WITH CLAUDICATION: Status: ACTIVE | Noted: 2024-08-03

## 2024-08-03 PROBLEM — Z79.4 TYPE 2 DIABETES MELLITUS WITH CIRCULATORY DISORDER, WITH LONG-TERM CURRENT USE OF INSULIN: Status: ACTIVE | Noted: 2024-08-03

## 2024-08-03 PROBLEM — R29.818 SUSPECTED SLEEP APNEA: Status: ACTIVE | Noted: 2024-08-03

## 2024-08-03 PROBLEM — E11.59 TYPE 2 DIABETES MELLITUS WITH CIRCULATORY DISORDER, WITH LONG-TERM CURRENT USE OF INSULIN: Status: ACTIVE | Noted: 2024-08-03

## 2024-08-03 PROBLEM — R73.9 HYPERGLYCEMIA: Status: RESOLVED | Noted: 2024-08-02 | Resolved: 2024-08-03

## 2024-08-03 PROBLEM — E11.69 TYPE 2 DIABETES MELLITUS WITH HYPERTRIGLYCERIDEMIA: Status: ACTIVE | Noted: 2024-08-03

## 2024-08-03 PROBLEM — E78.1 HYPERTRIGLYCERIDEMIA: Status: ACTIVE | Noted: 2024-08-03

## 2024-08-03 PROBLEM — E11.59 TYPE 2 DIABETES MELLITUS WITH CIRCULATORY DISORDER, WITHOUT LONG-TERM CURRENT USE OF INSULIN: Status: ACTIVE | Noted: 2024-08-03

## 2024-08-03 PROBLEM — M62.838 MUSCLE SPASM: Status: RESOLVED | Noted: 2024-05-22 | Resolved: 2024-08-03

## 2024-08-03 LAB
ANION GAP SERPL CALCULATED.3IONS-SCNC: 12 MMOL/L (ref 7–16)
BASOPHILS # BLD AUTO: 0.03 K/UL (ref 0–0.2)
BASOPHILS NFR BLD AUTO: 0.4 % (ref 0–1)
BUN SERPL-MCNC: 13 MG/DL (ref 7–18)
BUN/CREAT SERPL: 13 (ref 6–20)
CALCIUM SERPL-MCNC: 9.6 MG/DL (ref 8.5–10.1)
CHLORIDE SERPL-SCNC: 106 MMOL/L (ref 98–107)
CO2 SERPL-SCNC: 23 MMOL/L (ref 21–32)
CREAT SERPL-MCNC: 1.01 MG/DL (ref 0.7–1.3)
DIFFERENTIAL METHOD BLD: ABNORMAL
EGFR (NO RACE VARIABLE) (RUSH/TITUS): 87 ML/MIN/1.73M2
EOSINOPHIL # BLD AUTO: 0.08 K/UL (ref 0–0.5)
EOSINOPHIL NFR BLD AUTO: 1.2 % (ref 1–4)
ERYTHROCYTE [DISTWIDTH] IN BLOOD BY AUTOMATED COUNT: 13.4 % (ref 11.5–14.5)
GLUCOSE SERPL-MCNC: 221 MG/DL (ref 70–105)
GLUCOSE SERPL-MCNC: 227 MG/DL (ref 74–106)
GLUCOSE SERPL-MCNC: 247 MG/DL (ref 70–105)
GLUCOSE SERPL-MCNC: 264 MG/DL (ref 70–105)
HCT VFR BLD AUTO: 50 % (ref 40–54)
HGB BLD-MCNC: 15.7 G/DL (ref 13.5–18)
IMM GRANULOCYTES # BLD AUTO: 0.03 K/UL (ref 0–0.04)
IMM GRANULOCYTES NFR BLD: 0.4 % (ref 0–0.4)
LYMPHOCYTES # BLD AUTO: 1.8 K/UL (ref 1–4.8)
LYMPHOCYTES NFR BLD AUTO: 26.8 % (ref 27–41)
MCH RBC QN AUTO: 25 PG (ref 27–31)
MCHC RBC AUTO-ENTMCNC: 31.4 G/DL (ref 32–36)
MCV RBC AUTO: 79.6 FL (ref 80–96)
MONOCYTES # BLD AUTO: 0.58 K/UL (ref 0–0.8)
MONOCYTES NFR BLD AUTO: 8.6 % (ref 2–6)
MPC BLD CALC-MCNC: ABNORMAL G/DL
NEUTROPHILS # BLD AUTO: 4.19 K/UL (ref 1.8–7.7)
NEUTROPHILS NFR BLD AUTO: 62.6 % (ref 53–65)
NRBC # BLD AUTO: 0 X10E3/UL
NRBC, AUTO (.00): 0 %
PLATELET # BLD AUTO: 130 K/UL (ref 150–400)
PLATELET MORPHOLOGY: ABNORMAL
POTASSIUM SERPL-SCNC: 4 MMOL/L (ref 3.5–5.1)
RBC # BLD AUTO: 6.28 M/UL (ref 4.6–6.2)
RBC MORPH BLD: NORMAL
SODIUM SERPL-SCNC: 137 MMOL/L (ref 136–145)
WBC # BLD AUTO: 6.71 K/UL (ref 4.5–11)

## 2024-08-03 PROCEDURE — 99233 SBSQ HOSP IP/OBS HIGH 50: CPT | Mod: GT,,, | Performed by: INTERNAL MEDICINE

## 2024-08-03 PROCEDURE — 25000003 PHARM REV CODE 250: Performed by: HOSPITALIST

## 2024-08-03 PROCEDURE — 63600175 PHARM REV CODE 636 W HCPCS: Performed by: INTERNAL MEDICINE

## 2024-08-03 PROCEDURE — 94761 N-INVAS EAR/PLS OXIMETRY MLT: CPT

## 2024-08-03 PROCEDURE — 99239 HOSP IP/OBS DSCHRG MGMT >30: CPT | Mod: GC,,, | Performed by: HOSPITALIST

## 2024-08-03 PROCEDURE — 82962 GLUCOSE BLOOD TEST: CPT

## 2024-08-03 PROCEDURE — 25000003 PHARM REV CODE 250: Performed by: INTERNAL MEDICINE

## 2024-08-03 PROCEDURE — 80048 BASIC METABOLIC PNL TOTAL CA: CPT | Performed by: INTERNAL MEDICINE

## 2024-08-03 PROCEDURE — 25000242 PHARM REV CODE 250 ALT 637 W/ HCPCS: Performed by: HOSPITALIST

## 2024-08-03 PROCEDURE — 85025 COMPLETE CBC W/AUTO DIFF WBC: CPT | Performed by: INTERNAL MEDICINE

## 2024-08-03 PROCEDURE — 36415 COLL VENOUS BLD VENIPUNCTURE: CPT | Performed by: INTERNAL MEDICINE

## 2024-08-03 RX ORDER — LOSARTAN POTASSIUM 50 MG/1
50 TABLET ORAL DAILY
Status: DISCONTINUED | OUTPATIENT
Start: 2024-08-04 | End: 2024-08-03 | Stop reason: HOSPADM

## 2024-08-03 RX ORDER — HYDROCHLOROTHIAZIDE 25 MG/1
25 TABLET ORAL DAILY
Status: DISCONTINUED | OUTPATIENT
Start: 2024-08-03 | End: 2024-08-03 | Stop reason: HOSPADM

## 2024-08-03 RX ORDER — FENOFIBRATE 160 MG/1
160 TABLET ORAL DAILY
Status: DISCONTINUED | OUTPATIENT
Start: 2024-08-03 | End: 2024-08-03 | Stop reason: HOSPADM

## 2024-08-03 RX ORDER — ATORVASTATIN CALCIUM 40 MG/1
40 TABLET, FILM COATED ORAL DAILY
Qty: 90 TABLET | Refills: 1 | Status: SHIPPED | OUTPATIENT
Start: 2024-08-03 | End: 2025-08-03

## 2024-08-03 RX ORDER — GLIMEPIRIDE 2 MG/1
2 TABLET ORAL
Qty: 90 TABLET | Refills: 1 | Status: SHIPPED | OUTPATIENT
Start: 2024-08-04 | End: 2025-08-04

## 2024-08-03 RX ORDER — FENOFIBRATE 160 MG/1
160 TABLET ORAL DAILY
Qty: 90 TABLET | Refills: 1 | Status: SHIPPED | OUTPATIENT
Start: 2024-08-04 | End: 2025-08-04

## 2024-08-03 RX ORDER — GLIMEPIRIDE 2 MG/1
2 TABLET ORAL
Status: DISCONTINUED | OUTPATIENT
Start: 2024-08-03 | End: 2024-08-03 | Stop reason: HOSPADM

## 2024-08-03 RX ORDER — HYDROCHLOROTHIAZIDE 25 MG/1
25 TABLET ORAL DAILY
Qty: 30 TABLET | Refills: 5 | Status: SHIPPED | OUTPATIENT
Start: 2024-08-04 | End: 2025-08-04

## 2024-08-03 RX ORDER — LOSARTAN POTASSIUM 50 MG/1
50 TABLET ORAL DAILY
Qty: 90 TABLET | Refills: 1 | Status: SHIPPED | OUTPATIENT
Start: 2024-08-04 | End: 2025-08-04

## 2024-08-03 RX ORDER — NICOTINE 7MG/24HR
1 PATCH, TRANSDERMAL 24 HOURS TRANSDERMAL DAILY
Qty: 30 PATCH | Refills: 1 | Status: SHIPPED | OUTPATIENT
Start: 2024-08-03

## 2024-08-03 RX ORDER — METOPROLOL SUCCINATE 25 MG/1
12.5 TABLET, EXTENDED RELEASE ORAL DAILY
Qty: 45 TABLET | Refills: 1 | Status: SHIPPED | OUTPATIENT
Start: 2024-08-04 | End: 2025-08-04

## 2024-08-03 RX ORDER — ASPIRIN 81 MG/1
81 TABLET ORAL DAILY
Qty: 30 TABLET | Refills: 5 | Status: SHIPPED | OUTPATIENT
Start: 2024-08-04 | End: 2025-08-04

## 2024-08-03 RX ORDER — FAMOTIDINE 20 MG/1
20 TABLET, FILM COATED ORAL 2 TIMES DAILY
Status: DISCONTINUED | OUTPATIENT
Start: 2024-08-03 | End: 2024-08-03 | Stop reason: HOSPADM

## 2024-08-03 RX ADMIN — ATORVASTATIN CALCIUM 80 MG: 80 TABLET, FILM COATED ORAL at 08:08

## 2024-08-03 RX ADMIN — LOSARTAN POTASSIUM 25 MG: 25 TABLET, FILM COATED ORAL at 08:08

## 2024-08-03 RX ADMIN — GLIMEPIRIDE 2 MG: 2 TABLET ORAL at 10:08

## 2024-08-03 RX ADMIN — ASPIRIN 81 MG: 81 TABLET, COATED ORAL at 08:08

## 2024-08-03 RX ADMIN — INSULIN ASPART 4 UNITS: 100 INJECTION, SOLUTION INTRAVENOUS; SUBCUTANEOUS at 06:08

## 2024-08-03 RX ADMIN — LABETALOL HYDROCHLORIDE 10 MG: 5 INJECTION, SOLUTION INTRAVENOUS at 02:08

## 2024-08-03 RX ADMIN — METOPROLOL SUCCINATE 12.5 MG: 25 TABLET, EXTENDED RELEASE ORAL at 08:08

## 2024-08-03 RX ADMIN — ONDANSETRON 4 MG: 2 INJECTION INTRAMUSCULAR; INTRAVENOUS at 08:08

## 2024-08-03 RX ADMIN — HYDROCHLOROTHIAZIDE 25 MG: 25 TABLET ORAL at 10:08

## 2024-08-03 RX ADMIN — FAMOTIDINE 20 MG: 20 TABLET, FILM COATED ORAL at 10:08

## 2024-08-03 RX ADMIN — TICAGRELOR 90 MG: 90 TABLET ORAL at 08:08

## 2024-08-03 RX ADMIN — FENOFIBRATE 160 MG: 160 TABLET ORAL at 10:08

## 2024-08-04 VITALS
SYSTOLIC BLOOD PRESSURE: 171 MMHG | WEIGHT: 250 LBS | BODY MASS INDEX: 31.08 KG/M2 | OXYGEN SATURATION: 99 % | HEART RATE: 109 BPM | DIASTOLIC BLOOD PRESSURE: 95 MMHG | HEIGHT: 75 IN | TEMPERATURE: 98 F | RESPIRATION RATE: 16 BRPM

## 2024-08-06 LAB
GLUCOSE SERPL-MCNC: 483 MG/DL (ref 70–105)
OHS QRS DURATION: 76 MS
OHS QRS DURATION: 80 MS
OHS QTC CALCULATION: 389 MS
OHS QTC CALCULATION: 412 MS

## 2024-08-07 LAB — POC ACTIVATED CLOTTING TIME K: 345 SEC

## 2024-08-12 ENCOUNTER — OFFICE VISIT (OUTPATIENT)
Dept: FAMILY MEDICINE | Facility: CLINIC | Age: 59
End: 2024-08-12

## 2024-08-12 VITALS
WEIGHT: 250 LBS | BODY MASS INDEX: 31.08 KG/M2 | HEIGHT: 75 IN | DIASTOLIC BLOOD PRESSURE: 76 MMHG | OXYGEN SATURATION: 98 % | RESPIRATION RATE: 16 BRPM | TEMPERATURE: 98 F | SYSTOLIC BLOOD PRESSURE: 139 MMHG | HEART RATE: 89 BPM

## 2024-08-12 DIAGNOSIS — H53.8 BLURRY VISION, BILATERAL: Primary | ICD-10-CM

## 2024-08-12 DIAGNOSIS — E11.65 TYPE 2 DIABETES MELLITUS WITH HYPERGLYCEMIA, UNSPECIFIED WHETHER LONG TERM INSULIN USE: ICD-10-CM

## 2024-08-12 LAB — GLUCOSE SERPL-MCNC: 240 MG/DL (ref 70–110)

## 2024-08-12 NOTE — ASSESSMENT & PLAN NOTE
-Pt c/o 3 week Hx of blurry vision  -Pt states that he checks his glucose levels daily and it fluctuates.  -In-clinic Acu chek today- showed glucose of 240. Pt was advised to  increase his dosage of Glimepiride from one(2 mg) tablet daily to 1 and 1/2 tablet daily. Monitor morning glucose readings and f/u in 2 weeks.  -Pt was referred to opthalmology- Pt states he has blurry vision even when his glucose levels are within normal range.

## 2024-08-12 NOTE — PROGRESS NOTES
Marycarmen Hendricks MD    [address], [place] [address]  Phone: [phone]     Subjective     Name: Adam Parsons   YOB: 1965 (58 y.o.)  MRN: [id number]  Visit Date: 8/12/2024   Chief Complaint: ER F/U (NSTEMI, S/P LEFT HEART CATH 08.02.24), Hypertension (DX IN ER), Diabetes (DX IN ER), and Hyperlipidemia (DX IN ER)        HISTORY OF PRESENT ILLNESS:  HPI  Mr. Parsons is a 59 yo smoker who presents for hospital f/u  from  a Left heart cath on 8/2/2024. At that visit he was diagnosed with essential HTN, PVD, Hyperglyceridemia, Type 2 DM and suspected sleep apnea. Today Mr. Parsons is feeling well. He denies CP, SOB, edema, HA, or dizziness. He states that a week prior to his MI he started experiencing blurry vision. He has an appointment with [facility] next week. He is compliant with his medications and has not smoked since.     PAST MEDICAL HISTORY:  Significant Diagnoses - Patient  has a past medical history of Diabetes mellitus, type 2, Hyperlipidemia, Hypertension, and NSTEMI (non-ST elevated myocardial infarction) (08/02/2024).  Medications - Patient has a current medication list which includes the following long-term medication(s): aspirin, atorvastatin, fenofibrate, glimepiride, hydrochlorothiazide, losartan, metoprolol succinate, and ticagrelor.   Allergies - Patient has No Known Allergies.  Surgeries - Patient  has a past surgical history that includes ANGIOGRAM, CORONARY, WITH LEFT HEART CATHETERIZATION (N/A, 8/2/2024) and percutaneous coronary intervention, artery (N/A, 8/2/2024).  Family History - Patient family history includes Diabetes in his daughter and maternal uncle; Heart disease in his daughter and father.      SOCIAL HISTORY:  Tobacco - Patient  reports that he has quit smoking. His smoking use included cigarettes. He has never used smokeless tobacco.   Alcohol - Patient  reports that he does not currently use alcohol.   Recreational Drugs - Patient  reports no history  "of drug use.       Review of Systems   Constitutional:  Negative for diaphoresis.   Cardiovascular:  Negative for chest pain.   Musculoskeletal:  Negative for joint swelling.   Neurological:  Negative for dizziness and headaches.          Past Medical History:   Diagnosis Date    Diabetes mellitus, type 2     Hyperlipidemia     Hypertension     NSTEMI (non-ST elevated myocardial infarction) 08/02/2024        Review of patient's allergies indicates:  No Known Allergies     Past Surgical History:   Procedure Laterality Date    ANGIOGRAM, CORONARY, WITH LEFT HEART CATHETERIZATION N/A 8/2/2024    Procedure: Angiogram, Coronary, with Left Heart Cath;  Surgeon: Blake Hodgson MD;  Location: [facility] CATH LAB;  Service: Cardiology;  Laterality: N/A;    PERCUTANEOUS CORONARY INTERVENTION, ARTERY N/A 8/2/2024    Procedure: Percutaneous coronary intervention;  Surgeon: Blake Hodgson MD;  Location: [facility] CATH LAB;  Service: Cardiology;  Laterality: N/A;        Family History   Problem Relation Name Age of Onset    Heart disease Father      Diabetes Daughter      Heart disease Daughter      Diabetes Maternal Uncle         Current Outpatient Medications   Medication Instructions    aspirin (ECOTRIN) 81 mg, Oral, Daily    atorvastatin (LIPITOR) 40 mg, Oral, Daily    fenofibrate 160 mg, Oral, Daily    glimepiride (AMARYL) 2 mg, Oral, With breakfast    hydroCHLOROthiazide (HYDRODIURIL) 25 mg, Oral, Daily    losartan (COZAAR) 50 mg, Oral, Daily    metoprolol succinate (TOPROL-XL) 12.5 mg, Oral, Daily    nicotine (NICODERM CQ) 7 mg/24 hr 1 patch, Transdermal, Daily    ticagrelor (BRILINTA) 90 mg, Oral, 2 times daily        Objective     /76 (BP Location: Left arm, Patient Position: Sitting, BP Method: Large (Automatic))   Pulse 89   Temp 98.3 °F (36.8 °C) (Oral)   Resp 16   Ht 6' 3" (1.905 m)   Wt 113.4 kg (250 lb)   SpO2 98%   BMI 31.25 kg/m²     Physical Exam  Constitutional:       Appearance: Normal " appearance.   Cardiovascular:      Rate and Rhythm: Normal rate and regular rhythm.      Heart sounds: Normal heart sounds.   Pulmonary:      Effort: Pulmonary effort is normal.      Breath sounds: Normal breath sounds.   Musculoskeletal:         General: No swelling.   Neurological:      General: No focal deficit present.      Mental Status: He is alert.   Psychiatric:         Mood and Affect: Mood normal.          All recently obtained labs have been reviewed and discussed in detail with the patient.   Assessment     1. Blurry vision, bilateral    2. Type 2 diabetes mellitus with hyperglycemia, unspecified whether long term insulin use         Plan     Problem List Items Addressed This Visit          Ophtho    Blurry vision, bilateral - Primary     -Pt c/o 3 week Hx of blurry vision  -Pt states that he checks his glucose levels daily and it fluctuates.  -In-clinic Acu chek today- showed glucose of 240. Pt was advised to  increase his dosage of Glimepiride from one(2 mg) tablet daily to 1 and 1/2 tablet daily. Monitor morning glucose readings and f/u in 2 weeks.  -Pt was referred to opthalmology- Pt states he has blurry vision even when his glucose levels are within normal range.            Relevant Orders    Ambulatory referral/consult to Ophthalmology     Other Visit Diagnoses       Type 2 diabetes mellitus with hyperglycemia, unspecified whether long term insulin use        Relevant Orders    POCT glucose (Completed)              All orders:  Orders Placed This Encounter    Ambulatory referral/consult to Ophthalmology    POCT glucose          Follow up in about 2 weeks (around 8/26/2024).    Instructed patient that if symptoms fail to improve or worsen patient should seek immediate medical attention or report to the nearest emergency department. Patient expressed verbal agreement and understanding to this plan of care.   Marycarmen Hendricks MD  Family Medicine Residency PGY-1  [facility]

## 2024-08-20 ENCOUNTER — TELEPHONE (OUTPATIENT)
Dept: CARDIOLOGY | Facility: CLINIC | Age: 59
End: 2024-08-20

## 2024-08-20 ENCOUNTER — OFFICE VISIT (OUTPATIENT)
Dept: CARDIOLOGY | Facility: CLINIC | Age: 59
End: 2024-08-20

## 2024-08-20 VITALS
HEART RATE: 105 BPM | WEIGHT: 236 LBS | DIASTOLIC BLOOD PRESSURE: 70 MMHG | OXYGEN SATURATION: 98 % | HEIGHT: 75 IN | SYSTOLIC BLOOD PRESSURE: 100 MMHG | BODY MASS INDEX: 29.34 KG/M2

## 2024-08-20 DIAGNOSIS — I21.4 NSTEMI (NON-ST ELEVATED MYOCARDIAL INFARCTION): ICD-10-CM

## 2024-08-20 DIAGNOSIS — F17.200 TOBACCO DEPENDENCY: ICD-10-CM

## 2024-08-20 DIAGNOSIS — E11.51 TYPE 2 DIABETES MELLITUS WITH DIABETIC PERIPHERAL ANGIOPATHY WITHOUT GANGRENE, WITHOUT LONG-TERM CURRENT USE OF INSULIN: ICD-10-CM

## 2024-08-20 DIAGNOSIS — I10 ESSENTIAL HYPERTENSION: Primary | ICD-10-CM

## 2024-08-20 DIAGNOSIS — I73.9 PVD (PERIPHERAL VASCULAR DISEASE) WITH CLAUDICATION: ICD-10-CM

## 2024-08-20 PROCEDURE — 99214 OFFICE O/P EST MOD 30 MIN: CPT | Mod: PBBFAC | Performed by: NURSE PRACTITIONER

## 2024-08-20 PROCEDURE — 99214 OFFICE O/P EST MOD 30 MIN: CPT | Mod: S$PBB,,, | Performed by: NURSE PRACTITIONER

## 2024-08-20 PROCEDURE — 99999 PR PBB SHADOW E&M-EST. PATIENT-LVL IV: CPT | Mod: PBBFAC,,, | Performed by: NURSE PRACTITIONER

## 2024-08-20 NOTE — PROGRESS NOTES
PCP: Mary, Primary Doctor    Referring Provider:     Subjective:   Adam Parsons is a 58 y.o. male with no PMHx who presents for hospital discharge follow up.     Pt was recently hospitalized here at [facility] for NSTEMI s/p PCI to LAD. Echo showed LVEF 65% with no significant RWMA and no significant valvular abnormalities. His lipid panel revealed elevated triglycerides of 833, LDL was unable to be measured, HDL was 19. He was discharged on DAPT with ASA and Brilinta along with losartan, metoprolol, HCTZ, and high intensity statin. Pt was also diagnosed with DM2 during this admission. His A1C was 12. He was started on Amaryl.    Today, pt denies any chest pain, SOB or any other cardiac symptoms. His wife is with him today and has pt's BP log. BP has been low normal for the last 4-5 days with two hypotensive readings (80s/40s). Pt states he felt fatigued with those readings. Pt's wife states she has the entire family on a low sodium, low cholesterol diet and they are walking daily for exercise. Pt has lost approximately 14 pounds since discharge with these diet and lifestyle modifications.        Fhx: None  Shx: Recently stopped smoking, no etoh or drug use    EKG   Results for orders placed or performed during the hospital encounter of 08/01/24   EKG 12-lead    Collection Time: 08/02/24  5:00 AM   Result Value Ref Range    QRS Duration 80 ms    [facility] QTC Calculation 389 ms    Narrative    Test Reason : R07.9,    Vent. Rate : 105 BPM     Atrial Rate : 000 BPM     P-R Int : 132 ms          QRS Dur : 080 ms      QT Int : 310 ms       P-R-T Axes : 107 031 206 degrees     QTc Int : 389 ms    Sinus tachycardia  Left ventricular hypertrophy  Inferior/lateral ST-T abnormality may be due to the hypertrophy and/or  ischemia  Abnormal ECG    Confirmed by Nasra FERRARA, Kevin STANLEY ([id number]) on 8/6/2024 6:22:25 AM    Referred By: [email or web address]   SELF           Confirmed By:Kevin Hammonds MD     ECHO Results for orders placed during the hospital  "encounter of 08/01/24    Echo    Interpretation Summary    Left Ventricle: The left ventricle is normal in size. Mildly increased wall thickness. There is concentric hypertrophy. There is normal systolic function with a visually estimated ejection fraction of 55 - 60%. Ejection fraction by visual approximation is 65%. There is normal diastolic function.    Right Ventricle: Normal right ventricular cavity size. Systolic function is normal.    Aortic Valve: The aortic valve is a trileaflet valve.    IVC/SVC: Normal venous pressure at 3 mmHg.    Pericardium: There is a trivial effusion. No indication of cardiac tamponade.    [facility] Results for orders placed during the hospital encounter of 08/01/24    Cardiac catheterization    Conclusion    The Mid LAD to Dist LAD lesion was 90% stenosed with 0% stenosis post-intervention.    The pre-procedure left ventricular end diastolic pressure was 11.    Mid LAD to Dist LAD lesion: A 3.0X15MM SC BAL was used to predilate the lesion.    Mid LAD to Dist LAD lesion: A STENT SYNERGY XD 3.5X28MM stent was successfully placed.    Mid LAD to Dist LAD lesion: A 3.5X20MM NC BAL was used to post-dilate the lesion; prox 3.8mm, distal 3.6mm final.    The estimated blood loss was <50 mL.    There was single vessel coronary artery disease.    This was a successful PCI for acute myocardial infarction.    The procedure log was documented by Documenter: Roselyn Moran and verified by Blake Hodgson MD.    Date: 8/2/2024  Time: 3:40 PM        Lab Results   Component Value Date     (L) 08/20/2024    K 3.8 08/20/2024     08/20/2024    CO2 26 08/20/2024    BUN 21 (H) 08/20/2024    CREATININE 1.30 08/20/2024    CALCIUM 10.5 (H) 08/20/2024    ANIONGAP 8 08/20/2024       Lab Results   Component Value Date    CHOL 179 08/02/2024     Lab Results   Component Value Date    HDL 19 (L) 08/02/2024     No results found for: "LDLCALC"  Lab Results   Component Value Date    TRIG 833 (H) " "08/02/2024     Lab Results   Component Value Date    CHOLHDL 9.4 08/02/2024       Lab Results   Component Value Date    WBC 6.71 08/03/2024    HGB 15.7 08/03/2024    HCT 50.0 08/03/2024    MCV 79.6 (L) 08/03/2024     (L) 08/03/2024           Current Outpatient Medications:     aspirin (ECOTRIN) 81 MG EC tablet, Take 1 tablet (81 mg total) by mouth once daily., Disp: 30 tablet, Rfl: 5    atorvastatin (LIPITOR) 40 MG tablet, Take 1 tablet (40 mg total) by mouth once daily., Disp: 90 tablet, Rfl: 1    fenofibrate 160 MG Tab, Take 1 tablet (160 mg total) by mouth once daily., Disp: 90 tablet, Rfl: 1    glimepiride (AMARYL) 2 MG tablet, Take 1 tablet (2 mg total) by mouth daily with breakfast., Disp: 90 tablet, Rfl: 1    hydroCHLOROthiazide (HYDRODIURIL) 25 MG tablet, Take 1 tablet (25 mg total) by mouth once daily., Disp: 30 tablet, Rfl: 5    losartan (COZAAR) 50 MG tablet, Take 1 tablet (50 mg total) by mouth once daily., Disp: 90 tablet, Rfl: 1    metoprolol succinate (TOPROL-XL) 25 MG 24 hr tablet, Take 0.5 tablets (12.5 mg total) by mouth once daily., Disp: 45 tablet, Rfl: 1    ticagrelor (BRILINTA) 90 mg tablet, Take 1 tablet (90 mg total) by mouth 2 (two) times daily., Disp: 60 tablet, Rfl: 11    ticagrelor (BRILINTA) 90 mg tablet, Take 1 tablet (90 mg total) by mouth 2 (two) times daily., Disp: 60 tablet, Rfl: 0    Review of Systems   Constitutional:  Negative for chills, diaphoresis, fever and malaise/fatigue.   Respiratory:  Negative for cough and shortness of breath.    Cardiovascular:  Negative for chest pain, palpitations, orthopnea, leg swelling and PND.   Gastrointestinal:  Negative for abdominal pain, nausea and vomiting.   Musculoskeletal:  Negative for falls.   Neurological:  Negative for focal weakness and weakness.         Objective:   /70 (BP Location: Right arm, Patient Position: Sitting)   Pulse 105   Ht 6' 3" (1.905 m)   Wt 107 kg (236 lb)   SpO2 98%   BMI 29.50 kg/m² "     Physical Exam  Constitutional:       General: He is not in acute distress.     Appearance: Normal appearance.   Cardiovascular:      Rate and Rhythm: Normal rate and regular rhythm.      Heart sounds: No murmur heard.  Pulmonary:      Effort: Pulmonary effort is normal.      Breath sounds: Normal breath sounds.   Musculoskeletal:      Cervical back: Neck supple. No rigidity.      Right lower leg: No edema.      Left lower leg: No edema.   Skin:     General: Skin is warm and dry.   Neurological:      Mental Status: He is alert.           Assessment:     1. Essential hypertension        2. NSTEMI (non-ST elevated myocardial infarction)  Ambulatory referral/consult to Cardiology    Comprehensive Metabolic Panel      3. PVD (peripheral vascular disease) with claudication  Ambulatory referral/consult to Cardiology      4. Tobacco dependency        5. Type 2 diabetes mellitus with diabetic peripheral angiopathy without gangrene, without long-term current use of insulin              Plan:   NSTEMI (non-ST elevated myocardial infarction)  - s/p PCI to LAD  - continue DAPT x 1 year with ASA and Brilinta, ASA indefinitely  - pt to get his one month free supply of Brilinta from our pharmacy today as he did not receive that prior to discharge  - he is self pay so he will need to be changed to plavix at next visit with Dr. Shin which is in 3 weeks  - continue losartan 50mg daily and Toprol XL 12.5mg daily  - discontinue HCTZ due to hypotension  - pt declines cardiac rehab due to cost as he is self pay    Essential hypertension  - BP too well controlled  - discontinue HCTZ  - continue losartan 50mg daily and Toprol XL 12.5mg daily  - keep f/u with Dr. Shin for 9/10/24    PVD (peripheral vascular disease) with claudication  - Abnormal arterial ultrasounds  - No symptoms currently  - Did have c/o bilateral symptoms of claudication lower extremity worse on the left  - Dr. Shin will follow up when sees patient in clinic in 3  weeks  - pt has quit smoking    Tobacco dependency  - pt has quit smoking on his own    Type 2 diabetes mellitus with circulatory disorder, without long-term current use of insulin  - A1C was 12  - on Amaryl  - has lost approximately 14lbs since discharge with diet modification and exercise  - management per PCP    Keep f/u with Dr. Shin for 9/10/24

## 2024-08-20 NOTE — ASSESSMENT & PLAN NOTE
- s/p PCI to LAD  - continue DAPT x 1 year with ASA and Brilinta, ASA indefinitely  - pt to get his one month free supply of Brilinta from our pharmacy today as he did not receive that prior to discharge  - he is self pay so he will need to be changed to plavix at next visit with Dr. Shin which is in 3 weeks  - continue losartan 50mg daily and Toprol XL 12.5mg daily  - discontinue HCTZ due to hypotension  - pt declines cardiac rehab due to cost as he is self pay

## 2024-08-20 NOTE — TELEPHONE ENCOUNTER
Spoke to patient on today and notified him of his lab results per Ms. Cathryn NP. Patient verbalized understanding.----- Message from ANA MARÍA Boland sent at 8/20/2024  3:12 PM CDT -----  Please call this pt and let him know his labs are ok, kidney function is up a little from last time. He will need to have repeat labs the day he comes to see Dr. Shin. He does not have to fast. He can get his labs before or after that visit on 9/10/24. Thank you!! :)

## 2024-08-20 NOTE — ASSESSMENT & PLAN NOTE
- A1C was 12  - on Amaryl  - has lost approximately 14lbs since discharge with diet modification and exercise  - management per PCP

## 2024-08-20 NOTE — ASSESSMENT & PLAN NOTE
- BP too well controlled  - discontinue HCTZ  - continue losartan 50mg daily and Toprol XL 12.5mg daily  - keep f/u with Dr. Shin for 9/10/24

## 2024-08-20 NOTE — ASSESSMENT & PLAN NOTE
- Abnormal arterial ultrasounds  - No symptoms currently  - Did have c/o bilateral symptoms of claudication lower extremity worse on the left  - Dr. Shin will follow up when sees patient in clinic in 3 weeks  - pt has quit smoking

## 2024-08-26 ENCOUNTER — OFFICE VISIT (OUTPATIENT)
Dept: FAMILY MEDICINE | Facility: CLINIC | Age: 59
End: 2024-08-26

## 2024-08-26 VITALS
DIASTOLIC BLOOD PRESSURE: 70 MMHG | TEMPERATURE: 98 F | HEIGHT: 75 IN | OXYGEN SATURATION: 98 % | BODY MASS INDEX: 31.08 KG/M2 | HEART RATE: 90 BPM | WEIGHT: 250 LBS | RESPIRATION RATE: 16 BRPM | SYSTOLIC BLOOD PRESSURE: 123 MMHG

## 2024-08-26 DIAGNOSIS — E78.1 HYPERTRIGLYCERIDEMIA: Primary | ICD-10-CM

## 2024-08-26 DIAGNOSIS — D69.6 THROMBOCYTOPENIA: ICD-10-CM

## 2024-08-26 DIAGNOSIS — E11.59 TYPE 2 DIABETES MELLITUS WITH OTHER CIRCULATORY COMPLICATION, WITHOUT LONG-TERM CURRENT USE OF INSULIN: ICD-10-CM

## 2024-08-26 PROCEDURE — 99203 OFFICE O/P NEW LOW 30 MIN: CPT | Mod: ,,, | Performed by: FAMILY MEDICINE

## 2024-08-26 NOTE — PROGRESS NOTES
Marycarmen Hendricks MD    [address], [place] [address]  Phone: [phone]     Subjective     Name: Adam Parsons   YOB: 1965 (58 y.o.)  MRN: [id number]  Visit Date: 8/26/2024   Chief Complaint: Diabetes        HISTORY OF PRESENT ILLNESS:  HPI    Mr. Parsons presents today for his second follow-up visit after undergoing left heart catheterization with LAD stent placement on 8/2/2024. He reports feeling well and denies any chest pain, shortness of breath, edema, headaches, or dizziness. At his last visit, he mentioned experiencing blurry vision and was advised to increase his Amaryl dose from 2 mg to 3 mg and to follow up with ophthalmology.    Today, Mr. Parsons reports moderate improvement in his vision and notes that his blood glucose readings have decreased from the 300s to the 140s since the adjustment in his Amaryl dosage. He was also counseled on the importance of diet and exercise in managing his blood glucose levels, and he has agreed to implement the dietary changes discussed. He has an ophthalmology appointment scheduled for September 16th to further evaluate his vision. He followed up with cardiology on 8/20/2024, and hydrochlorothiazide was discontinued at that visit. Mr. Parsons confirms that he is compliant with his medications and states that he has not smoked since his hospitalization.    During the visit, lab results indicating thrombocytopenia were reviewed, and potential causes were discussed. Overall, Mr. Parsons feels better and continues to make progress in managing his health conditions.    PAST MEDICAL HISTORY:  Significant Diagnoses - Patient  has a past medical history of Diabetes mellitus, type 2, Hyperlipidemia, Hypertension, and NSTEMI (non-ST elevated myocardial infarction) (08/02/2024).  Medications - Patient has a current medication list which includes the following long-term medication(s): aspirin, atorvastatin, fenofibrate, glimepiride, losartan, metoprolol succinate,  ticagrelor, and ticagrelor.   Allergies - Patient has No Known Allergies.  Surgeries - Patient  has a past surgical history that includes ANGIOGRAM, CORONARY, WITH LEFT HEART CATHETERIZATION (N/A, 8/2/2024) and percutaneous coronary intervention, artery (N/A, 8/2/2024).  Family History - Patient family history includes Diabetes in his daughter and maternal uncle; Heart disease in his daughter and father.      SOCIAL HISTORY:  Tobacco - Patient  reports that he has quit smoking. His smoking use included cigarettes. He has never used smokeless tobacco.   Alcohol - Patient  reports that he does not currently use alcohol.   Recreational Drugs - Patient  reports no history of drug use.       Review of Systems   Cardiovascular:  Negative for chest pain, palpitations and leg swelling.   Neurological:  Negative for dizziness.          Past Medical History:   Diagnosis Date    Diabetes mellitus, type 2     Hyperlipidemia     Hypertension     NSTEMI (non-ST elevated myocardial infarction) 08/02/2024        Review of patient's allergies indicates:  No Known Allergies     Past Surgical History:   Procedure Laterality Date    ANGIOGRAM, CORONARY, WITH LEFT HEART CATHETERIZATION N/A 8/2/2024    Procedure: Angiogram, Coronary, with Left Heart Cath;  Surgeon: Blake Hodgson MD;  Location: [facility] CATH LAB;  Service: Cardiology;  Laterality: N/A;    PERCUTANEOUS CORONARY INTERVENTION, ARTERY N/A 8/2/2024    Procedure: Percutaneous coronary intervention;  Surgeon: Blake Hodgson MD;  Location: [facility] CATH LAB;  Service: Cardiology;  Laterality: N/A;        Family History   Problem Relation Name Age of Onset    Heart disease Father      Diabetes Daughter      Heart disease Daughter      Diabetes Maternal Uncle         Current Outpatient Medications   Medication Instructions    aspirin (ECOTRIN) 81 mg, Oral, Daily    atorvastatin (LIPITOR) 40 mg, Oral, Daily    BRILINTA 90 mg, Oral, 2 times daily     "fenofibrate 160 mg, Oral, Daily    glimepiride (AMARYL) 2 mg, Oral, With breakfast    losartan (COZAAR) 50 mg, Oral, Daily    metoprolol succinate (TOPROL-XL) 12.5 mg, Oral, Daily    ticagrelor (BRILINTA) 90 mg, Oral, 2 times daily        Objective     /70 (BP Location: Left arm, Patient Position: Sitting, BP Method: Medium (Automatic))   Pulse 90   Temp 98.3 °F (36.8 °C) (Oral)   Resp 16   Ht 6' 3" (1.905 m)   Wt 113.4 kg (250 lb)   SpO2 98%   BMI 31.25 kg/m²     Physical Exam  Constitutional:       Appearance: Normal appearance.   Neck:      Vascular: No JVD.   Cardiovascular:      Rate and Rhythm: Normal rate and regular rhythm.      Heart sounds: Normal heart sounds.   Pulmonary:      Effort: Pulmonary effort is normal.      Breath sounds: Normal breath sounds.   Musculoskeletal:         General: No swelling.      Right lower leg: No edema.      Left lower leg: No edema.   Neurological:      General: No focal deficit present.      Mental Status: He is alert.        All recently obtained labs have been reviewed and discussed in detail with the patient.   Assessment     1. Hypertriglyceridemia    2. Thrombocytopenia    3. Type 2 diabetes mellitus with other circulatory complication, without long-term current use of insulin         Plan     Problem List Items Addressed This Visit          Cardiac/Vascular    Hypertriglyceridemia - Primary     Pt lab showed a triglycerides in 800s- to do TSH             Hematology    Thrombocytopenia     Hospital labs showed thrombocytopenia. CBC ordered to reassess post hospitalization  status. New labs to investigate thrombocytopenia and  potential iron deficiency ( microcytic RBCs on hospital labs)         Relevant Orders    CBC Auto Differential       Endocrine    Type 2 diabetes mellitus with circulatory disorder, without long-term current use of insulin     Pt diabetes is not fully controlled.  Blood glucose has decreased from 300s to 150s since increasing his " Amaryl dose from 2mg to 3mg.    - Dietary modifications and exercise was discussed with patient.   - Patient has agreed with the plan to reduce simple carbohydrates and increase intake of fiber, protein and whole foods.   - Pt to increase his physical activity and continue to monitor his blood glucose daily.  - Pt to follow up in a few week for lab results and for re-evaluation of blood sugar control.  - Decision to alter medication for optimal blood glucose control to be revisited  at next appointment.  - Urine microalbumin ordered to assess kidney health     At next visit ( to save on cost- Pt is self-paid)  -Foot examination  -TSH ( to assess hypertriglyceridemia) and iron studies  to be ordered if warranted based on lab results           Relevant Orders    Microalbumin/Creatinine Ratio, Urine         All orders:  Orders Placed This Encounter    CBC Auto Differential    Microalbumin/Creatinine Ratio, Urine          Follow up in about 1 month (around 9/26/2024).    Instructed patient that if symptoms fail to improve or worsen patient should seek immediate medical attention or report to the nearest emergency department. Patient expressed verbal agreement and understanding to this plan of care.   Marycarmen Hendricks MD  Family Medicine Residency PGY-1  [facility]

## 2024-08-27 PROBLEM — D69.6 THROMBOCYTOPENIA: Status: ACTIVE | Noted: 2024-08-27

## 2024-08-27 NOTE — PATIENT INSTRUCTIONS
"   Clinical References    Patient Education        High Triglycerides   The Basics   Written by the doctors and editors at [facility]   What are high triglycerides? -- Triglycerides are fat-like substances in the blood. Everyone has them, but some people have too much of them. This can cause high levels of triglycerides in the blood, also called "high triglycerides."  Compared with people who have normal triglycerides, people with high triglycerides can have a higher risk of heart attacks, strokes, and other health problems.  People with very high triglycerides can get inflammation in the pancreas. The pancreas is an organ that makes hormones and fluids to help the body break down food. When the pancreas gets inflamed, it can cause serious health problems.  What should my triglyceride level be? -- Ask your doctor or nurse what your triglyceride level should be. In general, levels are:  Normal - Less than 150 mg/dL (If you live outside the [place], triglycerides are measured differently. The normal level is less than 1.7 mmol/L.)  A little bit high - 150 to 499 mg/dL (1.7 to 5.6 mmol/L)   Moderately high - 500 to 886 mg/dL (5.6 to 10.0 mmol/L)  Very high - Greater than 886 mg/dL (10.0 mmol/L)  Am I at higher risk for heart attack or stroke? -- Yes. Having high triglycerides increases your risk of heart attacks and stroke. But this is just 1 of many things that can increase your risk. You are also at higher risk if you:  Smoke cigarettes  Have high blood pressure  Are overweight  Have a parent, sister, or brother who got heart disease at a young age. (Young, in this case, means younger than 55 for men and younger than 65 for women.)  Are a man - Women are at risk too, but men have a higher risk.  Are older  Have diabetes - Especially if you cannot control your blood sugar well.  Your doctor can talk to you about your personal risk of having a heart attack or stroke. There are things you can do to lower your risk. "   Should I take medicine to lower my triglycerides? -- Not everyone who has high triglycerides needs medicines to lower them. Your doctor will decide if you need medicine. It depends on your age, family history, and other health concerns.   Medicines can include:  Medicines to lower triglyceride levels - These include fenofibrate (sample brand names: Antara, Fenoglide), nicotinic acid (sample brand names: Niacor, Niaspan), or fish oil (brand name: Lovaza).  Statins - These medicines can reduce the risk of a heart attack or stroke. They are used to lower cholesterol levels in the body. Many people with high triglycerides also have high cholesterol.  The medicine you take will depend on your triglyceride levels and other factors. If your triglycerides are very high, you might need more than 1 medicine.  Can I lower my triglycerides without medicines? -- Yes, you might be able to lower high triglycerides if you:  Lose weight (if you are overweight)  Get regular exercise  Avoid foods and drinks with a lot of sugar and carbohydrates - These include white bread, fruit juice, soda, and sweets.  Avoid red meat, butter, fried foods, cheese, oils, and nuts - This can help if your triglycerides are over 500.  Limit alcohol - This generally means no more than 2 drinks a day for men, and no more than 1 drink a day for women. If your triglycerides are over 500, ask your doctor or nurse if it is safe to drink alcohol.  All topics are updated as new evidence becomes available and our peer review process is complete.  This topic retrieved from [employer] on: Sep 21, 2021.  Topic 47914 Version 12.0  Release: 29.4.2 - C29.263  © 2021 UpToDate, Inc. and/or its affiliates. All rights reserved.  Consumer Information Use and Disclaimer   This information is not specific medical advice and does not replace information you receive from your health care provider. This is only a brief summary of general information. It does NOT include all  information about conditions, illnesses, injuries, tests, procedures, treatments, therapies, discharge instructions or life-style choices that may apply to you. You must talk with your health care provider for complete information about your health and treatment options. This information should not be used to decide whether or not to accept your health care provider's advice, instructions or recommendations. Only your health care provider has the knowledge and training to provide advice that is right for you. The use of this information is governed by the [employer] End User License Agreement, available at https://www.[employer]/en/solutions/[employer]/about/carey.The use of [employer] content is governed by the [employer] Terms of Use. ©2021 UpToDate, Inc. All rights reserved.  Copyright   © 2021 UpToDate, Inc. and/or its affiliates. All rights reserved.     Patient Education        2. Heart Healthy Diet   General   With a heart healthy food plan, you will learn to make better food choices. This diet may help you lower your blood cholesterol level, manage your blood pressure, and lower your risk for heart problems. Smaller portions may also be helpful.  Sodium is a type of mineral found in many foods. It helps keep the balance of fluids in your body. Too much sodium can raise your blood pressure. It can also make you take on extra water. This is called edema. Pay careful attention to how much salt or sodium is in your food. You may need to avoid salt or eat foods with less sodium.  Cholesterol is a fat-like, waxy substance in your blood. It is normal to have some cholesterol in your blood because your body makes it. You also get extra cholesterol from all animal products. These are foods like meats, eggs, and dairy products. Too much cholesterol in your blood can block or damage your blood vessels. This can lead to a heart attack or stroke.  Fats in your food have calories which give energy. Not all fats are bad.  Some fats are healthy, like the fat found in fish, nuts, and olive oil. These are called unsaturated fats. They help manage body functions and lower cholesterol levels. Learn about the best fats to use in your diet and where to use them. Eating too much fat may make you more likely to weigh more than is healthy. This raises your risk of many heart problems.  Fiber is found in plants. Meat and dairy products do not have fiber in them. Fiber can help you lower your unhealthy cholesterol level. You may need more water as you eat more fiber so you do not get hard stools.  What lifestyle changes are needed?   Eat a healthy diet and workout often. Try to use as many calories as you take in each day.  What changes to diet are needed?   Eat oily fish at least 2 times a week. These are fish like tuna, salmon, and mackerel.  Limit sodium to no more than 2,300 mg of sodium per day. This is about 1 teaspoon (5 grams) of table salt. Use little or no salt when making food. Try other spices or seasoning instead.  Limit how much cholesterol you eat to less than 300 mg per day. You can do this by having lean meats. Also eat lots of fruits, vegetables, and fat-free and low-fat dairy products.  Limit how much trans fats you eat. Trans fats are found in many processed foods like stick margarine, shortening, and some fried foods. Also, lower how much hydrogenated fats you eat. They are used to make pastries, biscuits, cookies, crackers, chips, and many snack foods.  Have no more than 1 drink per day of beer, wine, and mixed drinks (alcohol).  Who should use this diet?   A heart healthy diet is good for everyone.  What foods are good to eat?   Grains: Try to eat 6 to 8 servings of whole grain, high fiber foods each day. These are whole grain bread, cereals, brown rice, or pasta.  Fruits and vegetables: Eat 4 to 5 servings each day. Try to pick many kinds and colors. Try to eat more that are fresh or frozen. Look for low sodium or  salt-free if you choose canned. Rinse canned items before cooking or eating. Dried peas, beans, and lentils are also good.  Dairy: Choose low fat (1%) or fat-free milk. Eat nonfat or low-fat products.  Protein: Try to eat more low fat or lean meats like chicken and turkey. Eat less red meat and eat more fish, eggs, egg whites, and beans instead.  Fats: Use good fats found in fish, nuts, and avocados. Try using olive oil, canola oil, and low-sodium and low-fat salad dressing and mayonnaise. Use corn, safflower, sunflower, and soybean oils.  Condiments: Use low-sodium or salt-free broths, soups, soy sauce, and condiments. Pepper, herbs, spices, vinegar, lemon or lime juices are great for seasoning. Sugar, cocoa powder, honey, syrup, and jams may be eaten in small amounts.  Sweets: Low-fat, trans fat-free cookies, cakes, and pies; niall crackers; animal crackers; low-fat fig bars; and vladimir snaps.     What foods should be limited or avoided?   Grains: Salted breads, rolls, crackers, quick breads, self-rising flours, biscuit mixes, regular bread crumbs, instant hot cereals, commercially-prepared rice, pasta, stuffing mixes  Fruits and vegetables: Commercially-prepared potatoes and vegetable mixes, regular canned vegetables and juices, vegetables frozen with sauce or pickled vegetables, processed fruits with added sugar or salt  Dairy: Whole milk, malted milk, chocolate milk, buttermilk  Protein: Smoked, cured, salted, or canned meat, fish, or poultry such as abel and sausages  Fats: Cut back on solid fats like butter, lard, and margarine.  Condiments and snacks: Salted and canned peas, beans, and olives; salted snack foods; fried foods; soda, juices, or other sweetened drinks; commercially-softened water. Miso, salsa, ketchup, barbecue sauce, Worcestershire sauce, soy sauce, and teriyaki sauce are also high in salt.  Sweets: High-fat baked goods such as muffins, donuts, pastries, commercial baked goods  Helpful tips    When you go to a grocery store, have a list or a meal plan. Do not shop when you are hungry to avoid cravings for foods.  You need to know about the sodium and fat content of the food you eat. Read food labels with care. They will show you how much of each is in a serving. This amount is given as a percentage of the total amount you need each day. Reading the labels will help you make healthy food choices.     Avoid fast foods.  Watch your portions when eating out. Split an order or bring home half for another meal.     Talk to a dietitian for help.  Where can I learn more?   American Academy of Family Physicians  https://familydoctor.org/diet-and-exercise-for-a-healthy-heart/   American Heart Association  https://www.heart.org/en/healthy-living/healthy-eating/eat-smart/nutrition-basics/aha-diet-and-lifestyle-recommendations   [facility]  https://www.nhs.uk/live-well/eat-well/   Last Reviewed Date   2020-03-27  Consumer Information Use and Disclaimer   This information is not specific medical advice and does not replace information you receive from your health care provider. This is only a brief summary of general information. It does NOT include all information about conditions, illnesses, injuries, tests, procedures, treatments, therapies, discharge instructions or life-style choices that may apply to you. You must talk with your health care provider for complete information about your health and treatment options. This information should not be used to decide whether or not to accept your health care providers advice, instructions or recommendations. Only your health care provider has the knowledge and training to provide advice that is right for you.  Copyright   Copyright © 2021 UpToDate, Inc. and its affiliates and/or licensors. All rights reserved.     Patient Education        3. Diabetes and Diet   The Basics   Written by the doctors and editors at [facility]   Why is diet important in diabetes? -- Diet is  "important because it is part of diabetes treatment. Many people need to change what they eat and how much they eat to help treat their diabetes. It is important for people to treat their diabetes so that they:  Keep their blood sugar at or near a normal level  Prevent long-term problems, such as heart or kidney problems, that can happen in people with diabetes  Changing your diet can also help treat obesity, high blood pressure, and high cholesterol. These conditions can affect people with diabetes and can lead to future problems, such as heart attacks or strokes.  Who will work with me to change my diet? -- Your doctor or nurse will work with you to make a food plan to change your diet. They might also recommend that you work with a "dietitian." A dietitian is an expert on food and eating.  Do I need to eat at the same times every day? -- When and how often you should eat depends, in part, on the diabetes medicines you take. For example:  People who take about the same amount of insulin at the same time each day (called a "fixed regimen") should eat meals at the same times. This is also true for people who take pills that increase insulin levels, such as sulfonylureas. Eating meals at the same time every day helps prevent low blood sugar.  People who adjust the dose and timing of their insulin each day (called a "flexible regimen") do not always have to eat meals at the same time. That's because they can time their insulin dose for before they plan to eat, and also adjust the dose for how much they plan to eat.  People who take medicines that don't usually cause low blood sugar, such as metformin, don't have to eat meals at the same time every day.  What do I need to think about when planning what to eat? -- Our bodies break down the food we eat into small pieces called carbohydrates, proteins, and fats.  When planning what to eat, people with diabetes need to think about:  Carbohydrates (or "carbs") - " "Carbohydrates, which are sugars that our bodies use for energy, can raise a person's blood sugar level. Your doctor, nurse, or dietitian will tell you how many carbohydrates you should eat at each meal or snack. Foods that have carbohydrates include:  Bread, pasta, and rice  Vegetables and fruits  Dairy foods  Foods and drinks with added sugar  It is best to get your carbohydrates from fruits, vegetables, whole grains, and low-fat milk. It is best to avoid drinks with added sugar, like soda, juices, and sports drinks.   Protein - Your doctor, nurse, or dietitian will tell you how much protein you should eat each day. It is best to eat lean meats, fish, eggs, beans, peas, soy products, nuts, and seeds.  Fats - The type of fat you eat is more important than the amount of fat. "Saturated" and "trans" fats can increase your risk for heart problems, like a heart attack.  Foods that have saturated fats include meat, butter, cheese, and ice cream.  Foods that have trans fats include processed food with "partially hydrogenated oils" on the ingredient list. This may include fried foods, store bought cookies, muffins, pies, and cakes.  "Monounsaturated" and "polyunsaturated" fats are better for you. Foods with these types of fat include fish, avocado, olive oil, and nuts.  Calories - People need to eat a certain amount of calories each day to keep their weight the same. People who are overweight and want to lose weight need to eat fewer calories each day.  Fiber - Eating foods with a lot of fiber can help control a person's blood sugar level. Foods that have a lot of fiber include apples, green beans, peas, beans, lentils, nuts, oatmeal, and whole grains.  Salt - People who have high blood pressure should not eat foods that contain a lot of salt (also called sodium). People with high blood pressure should also eat healthy foods, such as fruits, vegetables, and low-fat dairy foods.  Alcohol - Having more than 1 drink (for " women) or 2 drinks (for men) a day can raise blood sugar levels. Also, drinks that have fruit juice or soda in them can raise blood sugar levels.  What can I do if I need to lose weight? -- If you need to lose weight, you can:  Exercise - Try to get at least 30 minutes of physical activity a day, most days of the week. Even gentle exercise, like walking, is good for your health. Some people with diabetes need to change their medicine dose before they exercise. They might also need to check their blood sugar levels before and after exercising.  Eat fewer calories - Your doctor, nurse, or dietitian can tell you how many calories you should eat each day in order to lose weight.  If you are worried about your weight, size, or shape, talk with your doctor, nurse, or dietitian. They can help you make changes to improve your health.  Can I eat the same foods as my family? -- Yes. You do not need to eat special foods if you have diabetes. You and your family can eat the same foods. Changing your diet is mostly about eating healthy foods and not eating too much.  What are the other parts of diabetes treatment? -- Besides changing your diet, the other parts of diabetes treatment are:  Exercise  Medicines  Some people with diabetes need to learn how to match their diet and exercise with their medicine dose. For example, people who use insulin might need to choose the dose of insulin they give themselves. To choose their dose, they need to think about:  What they plan to eat at the next meal  How much exercise they plan to do  What their blood sugar level is  If the diet and exercise do not match the medicine dose, a person's blood sugar level can get too low or too high. Blood sugar levels that are too low or too high can cause problems.  All topics are updated as new evidence becomes available and our peer review process is complete.  This topic retrieved from [employer] on: Sep 21, 2021.  Topic 28048 Version 7.0  Release:  29.4.2 - C29.263  © 2021 UpToDate, Inc. and/or its affiliates. All rights reserved.  Consumer Information Use and Disclaimer   This information is not specific medical advice and does not replace information you receive from your health care provider. This is only a brief summary of general information. It does NOT include all information about conditions, illnesses, injuries, tests, procedures, treatments, therapies, discharge instructions or life-style choices that may apply to you. You must talk with your health care provider for complete information about your health and treatment options. This information should not be used to decide whether or not to accept your health care provider's advice, instructions or recommendations. Only your health care provider has the knowledge and training to provide advice that is right for you. The use of this information is governed by the [employer] End User License Agreement, available at https://www.[employer]en/solutions/[employer]/about/carey.The use of [employer] content is governed by the [employer] Terms of Use. ©2021 UpToDate, Inc. All rights reserved.  Copyright   © 2021 UpToDate, Inc. and/or its affiliates. All rights reserved.     Patient Education        4. DASH Diet   About this topic   DASH stands for Dietary Approaches to Stop Hypertension. The DASH diet may help you lower blood pressure. It may also help keep you from getting high blood pressure. You will eat less fat and more fiber on the DASH diet.  This diet gives you more minerals that fight high blood pressure. Some nutrients in this diet are:  Potassium ? Acts to help you get rid of salt. This may help to lower blood pressure.  Calcium ? Makes blood vessels and muscles work the right way  Magnesium - Helps blood vessels relax  Fiber ? Helps you feel full. It also helps digestion.  What will the results be?   The DASH diet may help you:  Lower your blood pressure and cholesterol  Lower your risk for cancer,  heart disease, heart attack, and stroke. It may also lower your risk for heart failure, kidney stones, and diabetes.  Lose weight or keep a healthy weight  What lifestyle changes are needed?   Add regular exercise to get the most help from this diet.  Try to lower stress. Find ways to relax.  Stop smoking. Avoid secondhand smoke.  Limit alcohol intake.  What changes to diet are needed?   Know about poor eating habits. Then, you can fix them as you work with the program.  This diet encourages fruits and vegetables, whole grains, lean meats, healthy fats, and low-fat or fat-free dairy products.  This diet is lower in saturated fats, trans-fats, cholesterol, added sugars, and sodium.  Who should use this diet?   This eating plan is good for the whole family. It is also good for people with high blood pressure and those at risk for high blood pressure.  What foods are good to eat?   Grains: Try to eat 6 to 8 servings of whole grain, high fiber foods each day. These are bread, cereals, brown rice, or pasta.  Fruits and vegetables: Eat 4 to 5 servings each day. Try to pick many kinds and colors. Fresh or frozen are best. Look for low sodium or salt-free if you choose canned.  Dairy: Try to eat 2 to 3 servings of fat free and low fat milk products each day.  Lean meats, poultry, and seafood: Try to eat 6 servings or less of lean meats, poultry, and seafood each day. Try to choose more low fat or lean meats like chicken and turkey. Eat less red meat. Eat more fish instead.  Nuts, seeds, and legumes (dry beans and peas): Try to eat 4 to 5 servings each week. Try to pick nuts such as almonds and walnuts, sunflower seeds, peanut butter, soy beans, lentils, kidney beans, and split peas.  Fats and oils: Try to eat 2 to 3 servings of fats and oils each day. Eat good fats found in fish, nuts, and avocados. Try using olive oil or vegetable oils such as canola oil. Other good oils to try are corn, safflower, sunflower, or soybean  oils. Use low-sodium and low-fat salad dressing and mayonnaise.  Condiments: Pepper, herbs, spices, vinegar, lemon or lime juices are great for seasoning. Be careful to choose low-sodium or salt-free products if you use broths, soups, or soy sauce.  Sweets: Try to eat less than 5 servings each week. Choose low-fat and trans fat-free desserts. These are things like fruit flavored gelatin, sorbet, jelly beans, niall crackers, animal crackers, low-fat fig bars, and vladimir snaps. Eat fruit to satisfy your desire for sweets.     What foods should be limited or avoided?   Grains: Salted breads, rolls, crackers, quick breads, self-rising flours, biscuit mixes, regular bread crumbs, instant hot cereals, commercially-prepared rice, pasta, stuffing mixes  Fruits and vegetables: Commercially-prepared potatoes and vegetable mixes, regular canned vegetables and juices, vegetables frozen with sauce or pickled vegetables, processed fruits with salt or sodium  Milk: Whole milk, malted milk, chocolate milk, buttermilk, cheese, ice cream  Meats and beans: Smoked, cured, salted, or canned fish; meats or poultry such as abel, sausages, sardines; high-fat cuts of meat like beef, lamb, or pork; chicken with the skin on it  Fats: Cut back on solid fats like butter, lard, and margarine. Eat less food with high saturated fat, cholesterol and total fat.  Condiments and snacks: Salted and canned peas, beans, and olives; salted snack foods; fried foods; soda or other sweetened drinks  Sweets: High-fat baked goods such as muffins, donuts, pastries, commercial baked goods, candy bars  If you choose to drink alcohol, limit the amount you drink. Women should have 1 drink or less per day and men should have 2 drinks or less per day.  Helpful tips   Avoid eating canned vegetables and processed foods. These have a lot of salt in them. Look for a low-salt or low-sodium choice.  Try baking or broiling instead of frying food.  Write down the foods you  eat. This will help you track what you have eaten each week.  When you go to a grocery store, have a list or a meal plan. Do not shop when you are hungry to avoid cravings for foods.  Read food labels with care. They will show you how much is in a serving. The amount is given as a percentage of the total amount you need each day. Reading labels will help you make healthy food choices.       Avoid fast foods.  Talk to your doctor or dietitian to see if you need vitamin and mineral supplements to help you balance your diet.  Talk to a dietitian for help.  Where can I learn more?   [employer]  https://www.eatright.org/health/wellness/heart-and-cardiovascular-health/dash-diet-reducing-hypertension-through-diet-and-lifestyle   FamilyDoctor.org  http://familydoctor.org/familydoctor/en/prevention-wellness/food-nutrition/weight-loss/the-dash-diet-healthy-eating-to-control-your-blood-pressure.html   Last Reviewed Date   2021-03-15  Consumer Information Use and Disclaimer   This information is not specific medical advice and does not replace information you receive from your health care provider. This is only a brief summary of general information. It does NOT include all information about conditions, illnesses, injuries, tests, procedures, treatments, therapies, discharge instructions or life-style choices that may apply to you. You must talk with your health care provider for complete information about your health and treatment options. This information should not be used to decide whether or not to accept your health care providers advice, instructions or recommendations. Only your health care provider has the knowledge and training to provide advice that is right for you.  Copyright   Copyright © 2021 UpToDate, Inc. and its affiliates and/or licensors. All rights reserved.

## 2024-08-27 NOTE — ASSESSMENT & PLAN NOTE
Pt diabetes is not fully controlled.  Blood glucose has decreased from 300s to 150s since increasing his Amaryl dose from 2mg to 3mg.    - Dietary modifications and exercise was discussed with patient.   - Patient has agreed with the plan to reduce simple carbohydrates and increase intake of fiber, protein and whole foods.   - Pt to increase his physical activity and continue to monitor his blood glucose daily.  - Pt to follow up in a few week for lab results and for re-evaluation of blood sugar control.  - Decision to alter medication for optimal blood glucose control to be revisited  at next appointment.  - Urine microalbumin ordered to assess kidney health     At next visit ( to save on cost- Pt is self-paid)  -Foot examination  -TSH ( to assess hypertriglyceridemia) and iron studies  to be ordered if warranted based on lab results

## 2024-08-27 NOTE — ASSESSMENT & PLAN NOTE
Hospital labs showed thrombocytopenia. CBC ordered to reassess post hospitalization  status. New labs to investigate thrombocytopenia and  potential iron deficiency ( microcytic RBCs on hospital labs)

## 2024-09-10 ENCOUNTER — OFFICE VISIT (OUTPATIENT)
Dept: CARDIOLOGY | Facility: CLINIC | Age: 59
End: 2024-09-10

## 2024-09-10 VITALS
SYSTOLIC BLOOD PRESSURE: 130 MMHG | RESPIRATION RATE: 18 BRPM | BODY MASS INDEX: 33.93 KG/M2 | DIASTOLIC BLOOD PRESSURE: 80 MMHG | HEIGHT: 73 IN | HEART RATE: 97 BPM | WEIGHT: 256 LBS | OXYGEN SATURATION: 99 %

## 2024-09-10 DIAGNOSIS — I10 ESSENTIAL HYPERTENSION: Primary | ICD-10-CM

## 2024-09-10 DIAGNOSIS — E11.51 TYPE 2 DIABETES MELLITUS WITH DIABETIC PERIPHERAL ANGIOPATHY WITHOUT GANGRENE, WITHOUT LONG-TERM CURRENT USE OF INSULIN: ICD-10-CM

## 2024-09-10 DIAGNOSIS — E66.9 OBESITY (BMI 30-39.9): ICD-10-CM

## 2024-09-10 DIAGNOSIS — I25.10 CORONARY ARTERY DISEASE INVOLVING NATIVE CORONARY ARTERY OF NATIVE HEART WITHOUT ANGINA PECTORIS: ICD-10-CM

## 2024-09-10 DIAGNOSIS — E78.1 HYPERTRIGLYCERIDEMIA: ICD-10-CM

## 2024-09-10 DIAGNOSIS — I73.9 PVD (PERIPHERAL VASCULAR DISEASE) WITH CLAUDICATION: ICD-10-CM

## 2024-09-10 LAB
OHS QRS DURATION: 78 MS
OHS QTC CALCULATION: 432 MS

## 2024-09-10 PROCEDURE — 99213 OFFICE O/P EST LOW 20 MIN: CPT | Mod: PBBFAC,25 | Performed by: INTERNAL MEDICINE

## 2024-09-10 PROCEDURE — 99999 PR PBB SHADOW E&M-EST. PATIENT-LVL III: CPT | Mod: PBBFAC,,, | Performed by: INTERNAL MEDICINE

## 2024-09-10 PROCEDURE — 99214 OFFICE O/P EST MOD 30 MIN: CPT | Mod: S$PBB,,, | Performed by: INTERNAL MEDICINE

## 2024-09-10 PROCEDURE — 93010 ELECTROCARDIOGRAM REPORT: CPT | Mod: S$PBB,,, | Performed by: INTERNAL MEDICINE

## 2024-09-10 PROCEDURE — 93005 ELECTROCARDIOGRAM TRACING: CPT | Mod: PBBFAC | Performed by: INTERNAL MEDICINE

## 2024-09-10 RX ORDER — NITROGLYCERIN 0.4 MG/1
0.4 TABLET SUBLINGUAL EVERY 5 MIN PRN
Qty: 25 TABLET | Refills: 1 | Status: SHIPPED | OUTPATIENT
Start: 2024-09-10 | End: 2025-09-10

## 2024-09-10 RX ORDER — CLOPIDOGREL BISULFATE 75 MG/1
75 TABLET ORAL DAILY
Qty: 30 TABLET | Refills: 11 | Status: SHIPPED | OUTPATIENT
Start: 2024-09-10 | End: 2025-09-10

## 2024-09-10 NOTE — PROGRESS NOTES
PCP: Marycarmen Hendricks MD    Referring Provider:     Subjective:     Adam Parsons is a 58 y.o. male with no PMH who presented to ER for chest pain, enzemes elevated consistent with NSTEMI, underwent PCI with NADIA LAD.     9/9/24  Pt reports mid sternal chest pain, 9/10, resolved following emegent PCI with NADIA LAD for NSTEMI.  He has resumed normal activities of daily living without provocation of chest  pain, pressure or shortness of breath.  He is active, walking up to 30 mins daily without symptoms.  He has gained approximately 12 pounds since MI, attributed to smoking cessation.  Pt reports bilateral leg pain with ambulation, has been more pronounced since starting exercise program.         Pt was recently hospitalized here at [facility] for NSTEMI s/p PCI to LAD. Echo showed LVEF 65% with no significant RWMA and no significant valvular abnormalities. His lipid panel revealed elevated triglycerides of 833, LDL was unable to be measured, HDL was 19. He was discharged on DAPT with ASA and Brilinta along with losartan, metoprolol, HCTZ, and high intensity statin. Pt was also diagnosed with DM2 during this admission. His A1C was 12. He was started on Amaryl.    Today, pt denies any chest pain, SOB or any other cardiac symptoms. His wife is with him today and has pt's BP log. BP has been low normal for the last 4-5 days with two hypotensive readings (80s/40s). Pt states he felt fatigued with those readings. Pt's wife states she has the entire family on a low sodium, low cholesterol diet and they are walking daily for exercise. Pt has lost approximately 14 pounds since discharge with these diet and lifestyle modifications.        Fhx: None  Shx: Recently stopped smoking, no etoh or drug use    EKG   Results for orders placed or performed during the hospital encounter of 08/01/24   EKG 12-lead    Collection Time: 08/02/24  5:00 AM   Result Value Ref Range    QRS Duration 80 ms    [facility] QTC Calculation 389 ms    Narrative     Test Reason : R07.9,    Vent. Rate : 105 BPM     Atrial Rate : 000 BPM     P-R Int : 132 ms          QRS Dur : 080 ms      QT Int : 310 ms       P-R-T Axes : 107 031 206 degrees     QTc Int : 389 ms    Sinus tachycardia  Left ventricular hypertrophy  Inferior/lateral ST-T abnormality may be due to the hypertrophy and/or  ischemia  Abnormal ECG    Confirmed by Kevin Hammonds MD ([id number]) on 8/6/2024 6:22:25 AM    Referred By: [email or web address]   SELF           Confirmed By:Kevin Hammonds MD     ECHO Results for orders placed during the hospital encounter of 08/01/24    Echo    Interpretation Summary    Left Ventricle: The left ventricle is normal in size. Mildly increased wall thickness. There is concentric hypertrophy. There is normal systolic function with a visually estimated ejection fraction of 55 - 60%. Ejection fraction by visual approximation is 65%. There is normal diastolic function.    Right Ventricle: Normal right ventricular cavity size. Systolic function is normal.    Aortic Valve: The aortic valve is a trileaflet valve.    IVC/SVC: Normal venous pressure at 3 mmHg.    Pericardium: There is a trivial effusion. No indication of cardiac tamponade.    [facility] Results for orders placed during the hospital encounter of 08/01/24    Cardiac catheterization    Conclusion    The Mid LAD to Dist LAD lesion was 90% stenosed with 0% stenosis post-intervention.    The pre-procedure left ventricular end diastolic pressure was 11.    Mid LAD to Dist LAD lesion: A 3.0X15MM SC BAL was used to predilate the lesion.    Mid LAD to Dist LAD lesion: A STENT SYNERGY XD 3.5X28MM stent was successfully placed.    Mid LAD to Dist LAD lesion: A 3.5X20MM NC BAL was used to post-dilate the lesion; prox 3.8mm, distal 3.6mm final.    The estimated blood loss was <50 mL.    There was single vessel coronary artery disease.    This was a successful PCI for acute myocardial infarction.    The procedure log was documented by Documenter: Roselyn Moran  "and verified by Blake Hodgson MD.    Date: 8/2/2024  Time: 3:40 PM        Lab Results   Component Value Date     (L) 08/20/2024    K 3.8 08/20/2024     08/20/2024    CO2 26 08/20/2024    BUN 21 (H) 08/20/2024    CREATININE 1.30 08/20/2024    CALCIUM 10.5 (H) 08/20/2024    ANIONGAP 8 08/20/2024       Lab Results   Component Value Date    CHOL 179 08/02/2024     Lab Results   Component Value Date    HDL 19 (L) 08/02/2024     No results found for: "LDLCALC"  Lab Results   Component Value Date    TRIG 833 (H) 08/02/2024     Lab Results   Component Value Date    CHOLHDL 9.4 08/02/2024       Lab Results   Component Value Date    WBC 6.64 08/28/2024    HGB 14.0 08/28/2024    HCT 45.0 08/28/2024    MCV 79.8 (L) 08/28/2024     08/28/2024           Current Outpatient Medications:     aspirin (ECOTRIN) 81 MG EC tablet, Take 1 tablet (81 mg total) by mouth once daily., Disp: 30 tablet, Rfl: 5    atorvastatin (LIPITOR) 40 MG tablet, Take 1 tablet (40 mg total) by mouth once daily., Disp: 90 tablet, Rfl: 1    fenofibrate 160 MG Tab, Take 1 tablet (160 mg total) by mouth once daily., Disp: 90 tablet, Rfl: 1    glimepiride (AMARYL) 2 MG tablet, Take 1 tablet (2 mg total) by mouth daily with breakfast., Disp: 90 tablet, Rfl: 1    losartan (COZAAR) 50 MG tablet, Take 1 tablet (50 mg total) by mouth once daily., Disp: 90 tablet, Rfl: 1    metoprolol succinate (TOPROL-XL) 25 MG 24 hr tablet, Take 0.5 tablets (12.5 mg total) by mouth once daily., Disp: 45 tablet, Rfl: 1    ticagrelor (BRILINTA) 90 mg tablet, Take 1 tablet (90 mg total) by mouth 2 (two) times daily., Disp: 60 tablet, Rfl: 11    ticagrelor (BRILINTA) 90 mg tablet, Take 1 tablet (90 mg total) by mouth 2 (two) times daily., Disp: 60 tablet, Rfl: 0    Review of Systems   Constitutional:  Negative for chills, diaphoresis, fever and malaise/fatigue.   Respiratory:  Negative for cough and shortness of breath.    Cardiovascular:  Negative for chest pain, " "palpitations, orthopnea, leg swelling and PND.   Gastrointestinal:  Negative for abdominal pain, nausea and vomiting.   Musculoskeletal:  Negative for falls.   Neurological:  Negative for focal weakness and weakness.         Objective:   /80   Pulse 97   Resp 18   Ht 6' 1" (1.854 m)   Wt 116.1 kg (256 lb)   SpO2 99%   BMI 33.78 kg/m²     Physical Exam  Constitutional:       General: He is not in acute distress.     Appearance: Normal appearance.   Cardiovascular:      Rate and Rhythm: Normal rate and regular rhythm.      Heart sounds: No murmur heard.  Pulmonary:      Effort: Pulmonary effort is normal.      Breath sounds: Normal breath sounds.   Musculoskeletal:      Cervical back: Neck supple. No rigidity.      Right lower leg: No edema.      Left lower leg: No edema.   Skin:     General: Skin is warm and dry.   Neurological:      Mental Status: He is alert.           Assessment:     1. Essential hypertension  EKG 12-lead    EKG 12-lead      2. PVD (peripheral vascular disease) with claudication        3. Hypertriglyceridemia        4. Obesity (BMI 30-39.9)        5. Type 2 diabetes mellitus with diabetic peripheral angiopathy without gangrene, without long-term current use of insulin              Plan:   No problem-specific Assessment & Plan notes found for this encounter.      Keep f/u with Dr. Shin for 9/10/24        " symptoms change.

## 2024-09-22 PROBLEM — I25.10 CORONARY ARTERY DISEASE INVOLVING NATIVE CORONARY ARTERY OF NATIVE HEART WITHOUT ANGINA PECTORIS: Status: ACTIVE | Noted: 2024-09-22

## 2024-09-30 LAB
LEFT EYE DM RETINOPATHY: NEGATIVE
RIGHT EYE DM RETINOPATHY: NEGATIVE

## 2024-09-30 NOTE — TELEPHONE ENCOUNTER
Spoke to the patient in clinic on today, per Dr. Shin increase the patient's Losartan 50mg to 100mg and follow back up with us in a week.

## 2024-10-01 RX ORDER — LOSARTAN POTASSIUM 100 MG/1
100 TABLET ORAL DAILY
Qty: 90 TABLET | Refills: 1 | Status: SHIPPED | OUTPATIENT
Start: 2024-10-01

## 2024-11-04 ENCOUNTER — OFFICE VISIT (OUTPATIENT)
Dept: FAMILY MEDICINE | Facility: CLINIC | Age: 59
End: 2024-11-04

## 2024-11-04 VITALS
SYSTOLIC BLOOD PRESSURE: 117 MMHG | DIASTOLIC BLOOD PRESSURE: 75 MMHG | RESPIRATION RATE: 18 BRPM | OXYGEN SATURATION: 100 % | TEMPERATURE: 98 F | WEIGHT: 261 LBS | HEIGHT: 73 IN | HEART RATE: 88 BPM | BODY MASS INDEX: 34.59 KG/M2

## 2024-11-04 DIAGNOSIS — R04.2 HEMOPTYSIS: ICD-10-CM

## 2024-11-04 DIAGNOSIS — E11.65 TYPE 2 DIABETES MELLITUS WITH HYPERGLYCEMIA, WITHOUT LONG-TERM CURRENT USE OF INSULIN: Primary | ICD-10-CM

## 2024-11-04 DIAGNOSIS — E78.1 HYPERTRIGLYCERIDEMIA: ICD-10-CM

## 2024-11-04 LAB
CREAT UR-MCNC: 240 MG/DL (ref 39–259)
EST. AVERAGE GLUCOSE BLD GHB EST-MCNC: 203 MG/DL
HBA1C MFR BLD HPLC: 8.7 % (ref 4.5–6.6)
MICROALBUMIN UR-MCNC: 1.3 MG/DL (ref 0–2.8)
MICROALBUMIN/CREAT RATIO PNL UR: 5.4 MG/G (ref 0–30)
T4 FREE SERPL-MCNC: 1.41 NG/DL (ref 0.76–1.46)
TSH SERPL DL<=0.005 MIU/L-ACNC: 1.01 UIU/ML (ref 0.36–3.74)

## 2024-11-04 PROCEDURE — 84439 ASSAY OF FREE THYROXINE: CPT | Mod: ,,, | Performed by: CLINICAL MEDICAL LABORATORY

## 2024-11-04 PROCEDURE — 84443 ASSAY THYROID STIM HORMONE: CPT | Mod: ,,, | Performed by: CLINICAL MEDICAL LABORATORY

## 2024-11-04 PROCEDURE — 83036 HEMOGLOBIN GLYCOSYLATED A1C: CPT | Mod: ,,, | Performed by: CLINICAL MEDICAL LABORATORY

## 2024-11-04 PROCEDURE — 82043 UR ALBUMIN QUANTITATIVE: CPT | Mod: ,,, | Performed by: CLINICAL MEDICAL LABORATORY

## 2024-11-04 PROCEDURE — 82570 ASSAY OF URINE CREATININE: CPT | Mod: ,,, | Performed by: CLINICAL MEDICAL LABORATORY

## 2024-11-04 PROCEDURE — 99214 OFFICE O/P EST MOD 30 MIN: CPT | Mod: GC,,, | Performed by: FAMILY MEDICINE

## 2024-11-04 NOTE — ASSESSMENT & PLAN NOTE
Assessment:   Pt has history of uncontrolled diabetes.  Most current labs 08/02/24 -HbA1C= 12.1. Pt is on Amaryl and reports fluctuating blood sugar levels.  Plan:  Monitor- Obtain HbA1C every 3 months; target is <7  Evaluate dietary habits and encourage pt to adhere to diabetic diet, diabetic foot exam- normal, order urine microalbumin  Treat- If HbA1c is above 7 consider adding Metformin

## 2024-11-04 NOTE — ASSESSMENT & PLAN NOTE
Assessment:  Pt complains of productive cough with benito brown phlegm, that has worsened over the past few days. He reported a one day history of fever but denies night sweats, chills,or fatigue.     Plan:  Pt to monitor symptoms and if worsens, return for f/u visit or call clinic.  Evaluate -Chest X-ray to r/o TB, pneumonia and other lung infections- Chest X-Ray was unremarkable  Access: hemoptysis could be result of an infectious cause or association with current  anticoagulant medication  Treat empirically if indicated by results of X-ray

## 2024-11-04 NOTE — PROGRESS NOTES
Marycarmen Hendricks MD    [address], [place] [address]  Phone: [phone]     Subjective     Name: Adam Parsons   YOB: 1965 (58 y.o.)  MRN: [id number]  Visit Date: 11/4/2024   Chief Complaint: Follow-up (Patient came in today for a follow up visit. Patient does have a complaint of coughing he brought over the counter medicine, but its not working for him) and Cough        HISTORY OF PRESENT ILLNESS:  Patient is a 59 yo m  who presents for 3 month f/u visit. He is being followed for management of hypertension, diabetes,  hypertriglyceridemia, NSTEMI and PVD with claudication. Pt is feeling well today; however he has complaints of an acute cough that started a few days ago that as progressed to hemoptysis. He describes the the phlegm as a dark benito brown color that is about half a tea spoon. He had a fever for one day, but is currently afebrile. He denies chills myalgia, night sweats or fatigue.         PAST MEDICAL HISTORY:  Significant Diagnoses - Patient  has a past medical history of Diabetes mellitus, type 2, Hyperlipidemia, Hypertension, and NSTEMI (non-ST elevated myocardial infarction) (08/02/2024).  Medications - Patient has a current medication list which includes the following long-term medication(s): aspirin, atorvastatin, clopidogrel, fenofibrate, glimepiride, losartan, metoprolol succinate, and nitroglycerin.   Allergies - Patient has No Known Allergies.  Surgeries - Patient  has a past surgical history that includes ANGIOGRAM, CORONARY, WITH LEFT HEART CATHETERIZATION (N/A, 8/2/2024) and percutaneous coronary intervention, artery (N/A, 8/2/2024).  Family History - Patient family history includes Diabetes in his daughter and maternal uncle; Heart disease in his daughter and father.      SOCIAL HISTORY:  Tobacco - Patient  reports that he has quit smoking. His smoking use included cigarettes. He has never used smokeless tobacco.   Alcohol - Patient  reports that he does not  "currently use alcohol.   Recreational Drugs - Patient  reports no history of drug use.       Review of Systems   Constitutional:  Negative for chills, diaphoresis, fatigue, fever and night sweats.   Psychiatric/Behavioral: Negative.            Past Medical History:   Diagnosis Date    Diabetes mellitus, type 2     Hyperlipidemia     Hypertension     NSTEMI (non-ST elevated myocardial infarction) 08/02/2024        Review of patient's allergies indicates:  No Known Allergies     Past Surgical History:   Procedure Laterality Date    ANGIOGRAM, CORONARY, WITH LEFT HEART CATHETERIZATION N/A 8/2/2024    Procedure: Angiogram, Coronary, with Left Heart Cath;  Surgeon: Blake Hodgson MD;  Location: [facility] CATH LAB;  Service: Cardiology;  Laterality: N/A;    PERCUTANEOUS CORONARY INTERVENTION, ARTERY N/A 8/2/2024    Procedure: Percutaneous coronary intervention;  Surgeon: Blake Hodgson MD;  Location: [facility] CATH LAB;  Service: Cardiology;  Laterality: N/A;        Family History   Problem Relation Name Age of Onset    Heart disease Father      Diabetes Daughter      Heart disease Daughter      Diabetes Maternal Uncle         Current Outpatient Medications   Medication Instructions    aspirin (ECOTRIN) 81 mg, Oral, Daily    atorvastatin (LIPITOR) 40 mg, Oral, Daily    clopidogreL (PLAVIX) 75 mg, Oral, Daily    fenofibrate 160 mg, Oral, Daily    glimepiride (AMARYL) 2 mg, Oral, With breakfast    losartan (COZAAR) 100 mg, Oral, Daily    metoprolol succinate (TOPROL-XL) 12.5 mg, Oral, Daily    nitroGLYCERIN (NITROSTAT) 0.4 mg, Sublingual, Every 5 min PRN        Objective     /75 (BP Location: Left arm, Patient Position: Sitting)   Pulse 88   Temp 98.1 °F (36.7 °C) (Oral)   Resp 18   Ht 6' 1" (1.854 m)   Wt 118.4 kg (261 lb)   SpO2 100%   BMI 34.43 kg/m²     Physical Exam  Constitutional:       Appearance: Normal appearance. He is obese.   HENT:      Head: Normocephalic and atraumatic.   Cardiovascular: "      Rate and Rhythm: Normal rate and regular rhythm.      Heart sounds: Normal heart sounds.   Pulmonary:      Effort: Pulmonary effort is normal.      Breath sounds: Normal breath sounds.   Abdominal:      General: There is distension.   Musculoskeletal:         General: Normal range of motion.      Cervical back: Normal range of motion and neck supple.   Skin:     General: Skin is warm and dry.   Neurological:      Mental Status: He is alert.   Psychiatric:         Mood and Affect: Mood normal.         Behavior: Behavior normal.          All recently obtained labs have been reviewed and discussed in detail with the patient.   Assessment     1. Type 2 diabetes mellitus with hyperglycemia, without long-term current use of insulin    2. Hemoptysis    3. Hypertriglyceridemia         Plan     Problem List Items Addressed This Visit          Pulmonary    Hemoptysis     Assessment:  Pt complains of productive cough with benito brown phlegm, that has worsened over the past few days. He reported a one day history of fever but denies night sweats, chills,or fatigue.     Plan:  Pt to monitor symptoms and if worsens, return for f/u visit or call clinic.  Evaluate -Chest X-ray to r/o TB, pneumonia and other lung infections-   Access: hemoptysis could be result of an infectious cause or association with current  anticoagulant medication  Treat empirically if indicated by results of X-ray         Relevant Orders    X-Ray Chest PA And Lateral (Completed)       Cardiac/Vascular    Hypertriglyceridemia     Assessment:  Pt triglycerides was 833 on 8/02/2014 labs. If left untreated could lead to further increase cardiovascular risk. Hypothyroidism could be a possible cause of his hypertriglyceridemia.    Plan:  Dietary benefits were discussed with patient. Pt to monitor diet and increase exercise  Order TSH,free T4 to evaluate possible hypothyroidism   If labs reveal hypothyroidism -treat according and continue to monitor  triglyceride levels         Relevant Orders    TSH (Completed)    T4, Free (Completed)       Endocrine    Type 2 diabetes mellitus with hyperglycemia, without long-term current use of insulin - Primary     Assessment:   Pt has history of uncontrolled diabetes.  Most current labs 08/02/24 -HbA1C= 12.1. Pt is on Amaryl and reports fluctuating blood sugar levels.  Plan:  Monitor- Obtain HbA1C every 3 months; target is <7  Evaluate dietary habits and encourage pt to adhere to diabetic diet, diabetic foot exam- normal, order urine microalbumin  Treat- If HbA1c is above 7 consider adding Metformin           Relevant Orders    Microalbumin/Creatinine Ratio, Urine (Completed)    Hemoglobin A1C (Completed)         All orders:  Orders Placed This Encounter    X-Ray Chest PA And Lateral    TSH    T4, Free    Microalbumin/Creatinine Ratio, Urine    Hemoglobin A1C          Follow up in about 3 months (around 2/4/2025).    Instructed patient that if symptoms fail to improve or worsen patient should seek immediate medical attention or report to the nearest emergency department. Patient expressed verbal agreement and understanding to this plan of care.   Marycarmen Hendricks MD  Family Medicine Residency PGY-1  [facility]

## 2024-11-05 RX ORDER — BENZONATATE 100 MG/1
100 CAPSULE ORAL 3 TIMES DAILY PRN
Qty: 30 CAPSULE | Refills: 0 | Status: SHIPPED | OUTPATIENT
Start: 2024-11-05 | End: 2024-11-15

## 2024-11-05 RX ORDER — BUDESONIDE 0.5 MG/2ML
0.5 INHALANT ORAL 2 TIMES DAILY
Qty: 2 ML | Refills: 10 | Status: SHIPPED | OUTPATIENT
Start: 2024-11-05 | End: 2024-11-15

## 2024-11-05 RX ORDER — IPRATROPIUM BROMIDE AND ALBUTEROL SULFATE 2.5; .5 MG/3ML; MG/3ML
3 SOLUTION RESPIRATORY (INHALATION) EVERY 6 HOURS PRN
Qty: 75 ML | Refills: 0 | Status: SHIPPED | OUTPATIENT
Start: 2024-11-05 | End: 2025-11-05

## 2024-11-05 NOTE — ASSESSMENT & PLAN NOTE
Assessment:  Pt triglycerides was 833 on 8/02/2014 labs. If left untreated could lead to further increase cardiovascular risk. Hypothyroidism could be a possible cause of his hypertriglyceridemia.    Plan:  Dietary benefits were discussed with patient. Pt to monitor diet and increase exercise  Order TSH,free T4 to evaluate possible hypothyroidism -Normal thyroid function lab  If labs reveal hypothyroidism -treat according and continue to monitor triglyceride levels

## 2024-11-25 ENCOUNTER — OFFICE VISIT (OUTPATIENT)
Dept: CARDIOLOGY | Facility: CLINIC | Age: 59
End: 2024-11-25

## 2024-11-25 VITALS
BODY MASS INDEX: 32.33 KG/M2 | HEART RATE: 100 BPM | HEIGHT: 75 IN | OXYGEN SATURATION: 100 % | DIASTOLIC BLOOD PRESSURE: 78 MMHG | SYSTOLIC BLOOD PRESSURE: 140 MMHG | WEIGHT: 260 LBS

## 2024-11-25 DIAGNOSIS — I25.10 CORONARY ARTERY DISEASE INVOLVING NATIVE CORONARY ARTERY OF NATIVE HEART WITHOUT ANGINA PECTORIS: ICD-10-CM

## 2024-11-25 DIAGNOSIS — I21.4 NSTEMI (NON-ST ELEVATED MYOCARDIAL INFARCTION): Primary | ICD-10-CM

## 2024-11-25 DIAGNOSIS — F17.200 TOBACCO DEPENDENCY: ICD-10-CM

## 2024-11-25 DIAGNOSIS — I10 ESSENTIAL HYPERTENSION: ICD-10-CM

## 2024-11-25 DIAGNOSIS — I73.9 PVD (PERIPHERAL VASCULAR DISEASE) WITH CLAUDICATION: ICD-10-CM

## 2024-11-25 PROCEDURE — 99214 OFFICE O/P EST MOD 30 MIN: CPT | Mod: S$PBB,,, | Performed by: NURSE PRACTITIONER

## 2024-11-25 PROCEDURE — 99214 OFFICE O/P EST MOD 30 MIN: CPT | Mod: PBBFAC | Performed by: NURSE PRACTITIONER

## 2024-11-25 PROCEDURE — 99999 PR PBB SHADOW E&M-EST. PATIENT-LVL IV: CPT | Mod: PBBFAC,,, | Performed by: NURSE PRACTITIONER

## 2024-11-25 RX ORDER — CARVEDILOL 3.12 MG/1
3.12 TABLET ORAL 2 TIMES DAILY WITH MEALS
Qty: 180 TABLET | Refills: 3 | Status: SHIPPED | OUTPATIENT
Start: 2024-11-25 | End: 2025-11-25

## 2024-11-25 NOTE — PROGRESS NOTES
PCP: Marycarmen Hendricks MD    Referring Provider:     Subjective:   Adam Parsons is a 58 y.o. male with hx of NSTEMI s/p PCI to LAD, PVD, HTN, HLD who presents for follow up.     11/25/24: Patient denies any chest pain, SOB, edema, orthopnea or PND.  Patient still with complaint of claudication.    9/10/24 (seen by Dr. Shin): Pt reports mid sternal chest pain, 9/10, resolved following emegent PCI with NADIA LAD for NSTEMI. He has resumed normal activities of daily living without provocation of chest pain, pressure or shortness of breath. He is active, walking up to 30 mins daily without symptoms. He has gained approximately 12 pounds since MI, attributed to smoking cessation. Pt reports bilateral leg pain with ambulation, has been more pronounced since starting exercise program.     8/20/24: Pt was recently hospitalized here at [facility] for NSTEMI s/p PCI to LAD. Echo showed LVEF 65% with no significant RWMA and no significant valvular abnormalities. His lipid panel revealed elevated triglycerides of 833, LDL was unable to be measured, HDL was 19. He was discharged on DAPT with ASA and Brilinta along with losartan, metoprolol, HCTZ, and high intensity statin. Pt was also diagnosed with DM2 during this admission. His A1C was 12. He was started on Amaryl.     Today, pt denies any chest pain, SOB or any other cardiac symptoms. His wife is with him today and has pt's BP log. BP has been low normal for the last 4-5 days with two hypotensive readings (80s/40s). Pt states he felt fatigued with those readings. Pt's wife states she has the entire family on a low sodium, low cholesterol diet and they are walking daily for exercise. Pt has lost approximately 14 pounds since discharge with these diet and lifestyle modifications.    Fhx:  None  Shx:  Recently stopped smoking, no EtOH or drug use    EKG   Results for orders placed or performed in visit on 09/10/24   EKG 12-lead    Collection Time: 09/10/24  1:52 PM   Result Value  Ref Range    QRS Duration 78 ms    [facility] QTC Calculation 432 ms    Narrative    Test Reason : I10,    Vent. Rate : 096 BPM     Atrial Rate : 096 BPM     P-R Int : 164 ms          QRS Dur : 078 ms      QT Int : 342 ms       P-R-T Axes : 082 069 -83 degrees     QTc Int : 432 ms    Normal sinus rhythm  ST and T wave abnormality, consider inferolateral ischemia  Abnormal ECG  When compared with ECG of 02-AUG-2024 05:00,  PREVIOUS ECG IS PRESENT  Confirmed by Kirk Shin DO ([id number]) on 9/10/2024 5:04:20 PM    Referred By:             Confirmed By:Kirk Shin DO     ECHO Results for orders placed during the hospital encounter of 08/01/24    Echo    Interpretation Summary    Left Ventricle: The left ventricle is normal in size. Mildly increased wall thickness. There is concentric hypertrophy. There is normal systolic function with a visually estimated ejection fraction of 55 - 60%. Ejection fraction by visual approximation is 65%. There is normal diastolic function.    Right Ventricle: Normal right ventricular cavity size. Systolic function is normal.    Aortic Valve: The aortic valve is a trileaflet valve.    IVC/SVC: Normal venous pressure at 3 mmHg.    Pericardium: There is a trivial effusion. No indication of cardiac tamponade.    [facility] Results for orders placed during the hospital encounter of 08/01/24    Cardiac catheterization    Conclusion    The Mid LAD to Dist LAD lesion was 90% stenosed with 0% stenosis post-intervention.    The pre-procedure left ventricular end diastolic pressure was 11.    Mid LAD to Dist LAD lesion: A 3.0X15MM SC BAL was used to predilate the lesion.    Mid LAD to Dist LAD lesion: A STENT SYNERGY XD 3.5X28MM stent was successfully placed.    Mid LAD to Dist LAD lesion: A 3.5X20MM NC BAL was used to post-dilate the lesion; prox 3.8mm, distal 3.6mm final.    The estimated blood loss was <50 mL.    There was single vessel coronary artery disease.    This was a successful PCI for acute  "myocardial infarction.    The procedure log was documented by Documenter: Roselyn Moran and verified by Blake Hodgson MD.    Date: 8/2/2024  Time: 3:40 PM        Lab Results   Component Value Date     (L) 08/20/2024    K 3.8 08/20/2024     08/20/2024    CO2 26 08/20/2024    BUN 21 (H) 08/20/2024    CREATININE 1.30 08/20/2024    CALCIUM 10.5 (H) 08/20/2024    ANIONGAP 8 08/20/2024       Lab Results   Component Value Date    CHOL 179 08/02/2024     Lab Results   Component Value Date    HDL 19 (L) 08/02/2024     No results found for: "LDLCALC"  Lab Results   Component Value Date    TRIG 833 (H) 08/02/2024     Lab Results   Component Value Date    CHOLHDL 9.4 08/02/2024       Lab Results   Component Value Date    WBC 6.64 08/28/2024    HGB 14.0 08/28/2024    HCT 45.0 08/28/2024    MCV 79.8 (L) 08/28/2024     08/28/2024           Current Outpatient Medications:     albuterol-ipratropium (DUO-NEB) 2.5 mg-0.5 mg/3 mL nebulizer solution, Take 3 mLs by nebulization every 6 (six) hours as needed for Wheezing. Rescue, Disp: 75 mL, Rfl: 0    aspirin (ECOTRIN) 81 MG EC tablet, Take 1 tablet (81 mg total) by mouth once daily., Disp: 30 tablet, Rfl: 5    atorvastatin (LIPITOR) 40 MG tablet, Take 1 tablet (40 mg total) by mouth once daily., Disp: 90 tablet, Rfl: 1    clopidogreL (PLAVIX) 75 mg tablet, Take 1 tablet (75 mg total) by mouth once daily., Disp: 30 tablet, Rfl: 11    fenofibrate 160 MG Tab, Take 1 tablet (160 mg total) by mouth once daily., Disp: 90 tablet, Rfl: 1    losartan (COZAAR) 100 MG tablet, Take 1 tablet (100 mg total) by mouth once daily., Disp: 90 tablet, Rfl: 1    nitroGLYCERIN (NITROSTAT) 0.4 MG SL tablet, Place 1 tablet (0.4 mg total) under the tongue every 5 (five) minutes as needed., Disp: 25 tablet, Rfl: 1    budesonide (PULMICORT) 0.5 mg/2 mL nebulizer solution, Take 2 mLs (0.5 mg total) by nebulization 2 (two) times a day. Controller for 10 days, Disp: 2 mL, Rfl: 10    " "carvediloL (COREG) 3.125 MG tablet, Take 1 tablet (3.125 mg total) by mouth 2 (two) times daily with meals., Disp: 180 tablet, Rfl: 3    glimepiride (AMARYL) 2 MG tablet, Take 1 tablet (2 mg total) by mouth daily with breakfast., Disp: 90 tablet, Rfl: 1    Review of Systems   Constitutional:  Negative for chills, diaphoresis, fever and malaise/fatigue.   Respiratory:  Negative for cough and shortness of breath.    Cardiovascular:  Positive for claudication. Negative for chest pain, palpitations, orthopnea, leg swelling and PND.   Gastrointestinal:  Negative for abdominal pain, nausea and vomiting.   Musculoskeletal:  Negative for falls.   Neurological:  Negative for focal weakness and weakness.         Objective:   BP (!) 140/78 (BP Location: Left arm, Patient Position: Sitting)   Pulse 100   Ht 6' 3" (1.905 m)   Wt 117.9 kg (260 lb)   SpO2 100%   BMI 32.50 kg/m²     Physical Exam  Constitutional:       General: He is not in acute distress.     Appearance: Normal appearance.   Cardiovascular:      Rate and Rhythm: Normal rate and regular rhythm.      Heart sounds: No murmur heard.  Pulmonary:      Effort: Pulmonary effort is normal.      Breath sounds: Normal breath sounds.   Musculoskeletal:      Cervical back: Neck supple. No rigidity.      Right lower leg: No edema.      Left lower leg: No edema.   Skin:     General: Skin is warm and dry.   Neurological:      Mental Status: He is alert.           Assessment:     1. NSTEMI (non-ST elevated myocardial infarction)        2. Essential hypertension        3. PVD (peripheral vascular disease) with claudication        4. Coronary artery disease involving native coronary artery of native heart without angina pectoris        5. Tobacco dependency              Plan:   NSTEMI (non-ST elevated myocardial infarction)  - s/p PCI to LAD  - continue DAPT x 1 year with ASA and Plavix, ASA indefinitely  - discontinue metoprolol  - start Coreg 3.125 mg b.i.d.  - continue " losartan 50mg daily   - pt declines cardiac rehab due to cost as he is self pay    Essential hypertension  - /78,   - change metoprolol to Coreg 3.125 mg b.i.d.  - continue losartan 100 mg daily  - check BP at home 2 to 3 times a day and keep a log  - follow-up in 2 weeks for BP check with the nurse, bring BP log    PVD (peripheral vascular disease) with claudication  - Abnormal arterial ultrasounds - bilateral obstruction of both illiacs   - still with complaint of claudication  - we will discuss with Dr. Shin    Coronary artery disease involving native coronary artery of native heart without angina pectoris  NSTEMI - [facility] 08/01/2024 - single-vessel CAD s/p PCI to LAD    - DAPT with ASA and Plavix x1 year, ASA indefinitely  - continue atorvastatin and fenofibrate  - change metoprolol to Coreg 3.125 mg b.i.d.      Tobacco dependency  - pt has quit smoking on his own      Follow-up in 2 weeks for BP check with nurse  Follow-up with Dr. Shin in 6 months

## 2024-11-25 NOTE — ASSESSMENT & PLAN NOTE
- /78,   - change metoprolol to Coreg 3.125 mg b.i.d.  - continue losartan 100 mg daily  - check BP at home 2 to 3 times a day and keep a log  - follow-up in 2 weeks for BP check with the nurse, bring BP log

## 2024-11-25 NOTE — ASSESSMENT & PLAN NOTE
- s/p PCI to LAD  - continue DAPT x 1 year with ASA and Plavix, ASA indefinitely  - discontinue metoprolol  - start Coreg 3.125 mg b.i.d.  - continue losartan 50mg daily   - pt declines cardiac rehab due to cost as he is self pay

## 2024-11-25 NOTE — ASSESSMENT & PLAN NOTE
NSTEMI - [facility] 08/01/2024 - single-vessel CAD s/p PCI to LAD    - DAPT with ASA and Plavix x1 year, ASA indefinitely  - continue atorvastatin and fenofibrate  - change metoprolol to Coreg 3.125 mg b.i.d.

## 2024-11-25 NOTE — ASSESSMENT & PLAN NOTE
- Abnormal arterial ultrasounds - bilateral obstruction of both illiacs   - still with complaint of claudication  - we will discuss with Dr. Shin

## 2024-11-25 NOTE — PATIENT INSTRUCTIONS
Stop taking metoprolol (Toprol XL)    Start taking carvedilol (coreg) 3.125mg twice a day    Check blood pressure and heart rate at home 2-3 times a day and write it down    Follow up in 2 weeks for blood pressure check with the nurse    Follow up with Dr. Shin in 6 months

## 2024-12-02 ENCOUNTER — OFFICE VISIT (OUTPATIENT)
Dept: FAMILY MEDICINE | Facility: CLINIC | Age: 59
End: 2024-12-02

## 2024-12-02 VITALS
OXYGEN SATURATION: 100 % | DIASTOLIC BLOOD PRESSURE: 80 MMHG | HEIGHT: 75 IN | WEIGHT: 260.63 LBS | RESPIRATION RATE: 18 BRPM | TEMPERATURE: 98 F | BODY MASS INDEX: 32.41 KG/M2 | SYSTOLIC BLOOD PRESSURE: 138 MMHG | HEART RATE: 77 BPM

## 2024-12-02 DIAGNOSIS — I10 ESSENTIAL HYPERTENSION: ICD-10-CM

## 2024-12-02 DIAGNOSIS — R89.9 ABNORMAL LABORATORY TEST: ICD-10-CM

## 2024-12-02 DIAGNOSIS — E11.65 TYPE 2 DIABETES MELLITUS WITH HYPERGLYCEMIA, WITHOUT LONG-TERM CURRENT USE OF INSULIN: Primary | ICD-10-CM

## 2024-12-02 DIAGNOSIS — K13.0 VERRUCOUS LESION OF LIP: ICD-10-CM

## 2024-12-02 LAB
ALBUMIN SERPL BCP-MCNC: 4.1 G/DL (ref 3.5–5)
ALBUMIN/GLOB SERPL: 1.6 {RATIO}
ALP SERPL-CCNC: 80 U/L (ref 40–150)
ALT SERPL W P-5'-P-CCNC: 132 U/L
ANION GAP SERPL CALCULATED.3IONS-SCNC: 11 MMOL/L (ref 7–16)
AST SERPL W P-5'-P-CCNC: 84 U/L (ref 5–34)
BILIRUB SERPL-MCNC: 0.6 MG/DL
BUN SERPL-MCNC: 13 MG/DL (ref 8–26)
BUN/CREAT SERPL: 10 (ref 6–20)
CALCIUM SERPL-MCNC: 9.5 MG/DL (ref 8.4–10.2)
CHLORIDE SERPL-SCNC: 104 MMOL/L (ref 98–107)
CHOLEST SERPL-MCNC: 117 MG/DL
CHOLEST/HDLC SERPL: 6.9 {RATIO}
CO2 SERPL-SCNC: 25 MMOL/L (ref 22–29)
CREAT SERPL-MCNC: 1.32 MG/DL (ref 0.72–1.25)
EGFR (NO RACE VARIABLE) (RUSH/TITUS): 63 ML/MIN/1.73M2
GLOBULIN SER-MCNC: 2.5 G/DL (ref 2–4)
GLUCOSE SERPL-MCNC: 187 MG/DL (ref 74–100)
HDLC SERPL-MCNC: 17 MG/DL (ref 35–60)
LDLC SERPL CALC-MCNC: 66 MG/DL
LDLC/HDLC SERPL: 3.9 {RATIO}
NONHDLC SERPL-MCNC: 100 MG/DL
POTASSIUM SERPL-SCNC: 4.4 MMOL/L (ref 3.5–5.1)
PROT SERPL-MCNC: 6.6 G/DL (ref 6.4–8.3)
SODIUM SERPL-SCNC: 136 MMOL/L (ref 136–145)
TRIGL SERPL-MCNC: 170 MG/DL (ref 34–140)
VLDLC SERPL-MCNC: 34 MG/DL

## 2024-12-02 RX ORDER — METFORMIN HYDROCHLORIDE 500 MG/1
500 TABLET ORAL 2 TIMES DAILY WITH MEALS
Qty: 180 TABLET | Refills: 0 | Status: SHIPPED | OUTPATIENT
Start: 2024-12-02 | End: 2025-03-02

## 2024-12-02 NOTE — PROGRESS NOTES
Marycarmen Hendricks MD    [address], [place] [address]  Phone: [phone]     Subjective     Name: Adam Parsons   YOB: 1965 (58 y.o.)  MRN: [id number]  Visit Date: 12/2/2024   Chief Complaint: Follow-up (Room 10 f/u on new medicine coreg(cardiologist) and increased losartan from 50mg to 100mg ) and Health Maintenance (Colon screening never done )        HISTORY OF PRESENT ILLNESS:  Patient is a 58 year old male who presents to the clinic for a follow up visit and medication reconcilliation. Patient reported that his blood pressure has been uncontrolled since his last  clinic visit. He last visited his cardiologist Dr. Cadena, less than a week ago, who switched him from Metoprolol to Carvedilol for better blood pressure control. HbA1c drawn at previous visit was 8.7%. Patient is currently on Amaryl .  Addition of Metformin for better blood sugar control was discussed. Last lipid panel showed a triglyceride of 833 four months ago, patient was advised to discontinue fenofibrate and use fish oil instead as he is currently on a high intensity statin.   Patient complains of one week history of lesion on left upper lip that has been present  from a week. He stated that it started off  as a small bump. Patient reports that whenever he picks at the lesion it bleeds a whole lot. Patient reported similar lesion under his neck that's been present for the past 4 months, around the time he had NSTEMI.       PAST MEDICAL HISTORY:  Significant Diagnoses - Patient  has a past medical history of Diabetes mellitus, type 2, Hyperlipidemia, Hypertension, and NSTEMI (non-ST elevated myocardial infarction) (08/02/2024).  Medications - Patient has a current medication list which includes the following long-term medication(s): albuterol-ipratropium, aspirin, atorvastatin, carvedilol, clopidogrel, fenofibrate, glimepiride, losartan, nitroglycerin, budesonide, metformin, and omega-3 fatty acids-fish oil.   Allergies -  Patient has No Known Allergies.  Surgeries - Patient  has a past surgical history that includes ANGIOGRAM, CORONARY, WITH LEFT HEART CATHETERIZATION (N/A, 8/2/2024) and percutaneous coronary intervention, artery (N/A, 8/2/2024).  Family History - Patient family history includes Diabetes in his daughter and maternal uncle; Heart disease in his daughter and father.      SOCIAL HISTORY:  Tobacco - Patient  reports that he has quit smoking. His smoking use included cigarettes. He has never used smokeless tobacco.   Alcohol - Patient  reports that he does not currently use alcohol.   Recreational Drugs - Patient  reports no history of drug use.       Review of Systems   Constitutional:  Negative for activity change and chills.   Cardiovascular:  Negative for chest pain, palpitations and leg swelling.   Gastrointestinal:  Negative for blood in stool, diarrhea and nausea.   Endocrine: Negative for cold intolerance and heat intolerance.   Musculoskeletal:  Negative for arthralgias, joint swelling and myalgias.   Integumentary:  Positive for mole/lesion.   Neurological:  Negative for dizziness.   Hematological: Negative.    Psychiatric/Behavioral: Negative.  Negative for agitation.           Past Medical History:   Diagnosis Date    Diabetes mellitus, type 2     Hyperlipidemia     Hypertension     NSTEMI (non-ST elevated myocardial infarction) 08/02/2024        Review of patient's allergies indicates:  No Known Allergies     Past Surgical History:   Procedure Laterality Date    ANGIOGRAM, CORONARY, WITH LEFT HEART CATHETERIZATION N/A 8/2/2024    Procedure: Angiogram, Coronary, with Left Heart Cath;  Surgeon: Blake Hodgson MD;  Location: [facility] CATH LAB;  Service: Cardiology;  Laterality: N/A;    PERCUTANEOUS CORONARY INTERVENTION, ARTERY N/A 8/2/2024    Procedure: Percutaneous coronary intervention;  Surgeon: Blake Hodgson MD;  Location: [facility] CATH LAB;  Service: Cardiology;  Laterality: N/A;        Family  "History   Problem Relation Name Age of Onset    Heart disease Father      Diabetes Daughter      Heart disease Daughter      Diabetes Maternal Uncle         Current Outpatient Medications   Medication Instructions    albuterol-ipratropium (DUO-NEB) 2.5 mg-0.5 mg/3 mL nebulizer solution 3 mLs, Nebulization, Every 6 hours PRN, Rescue    aspirin (ECOTRIN) 81 mg, Oral, Daily    atorvastatin (LIPITOR) 40 mg, Oral, Daily    budesonide (PULMICORT) 0.5 mg, Nebulization, 2 times daily, Controller    carvediloL (COREG) 3.125 mg, Oral, 2 times daily with meals    clopidogreL (PLAVIX) 75 mg, Oral, Daily    fenofibrate 160 mg, Oral, Daily    glimepiride (AMARYL) 2 mg, Oral, With breakfast    losartan (COZAAR) 100 mg, Oral, Daily    metFORMIN (GLUCOPHAGE) 500 mg, Oral, 2 times daily with meals    nitroGLYCERIN (NITROSTAT) 0.4 mg, Sublingual, Every 5 min PRN    omega-3 fatty acids-fish oil 340-1,000 mg Cap 1 capsule, Oral, Daily        Objective     /80 (BP Location: Left arm, Patient Position: Sitting)   Pulse 77   Temp 98.1 °F (36.7 °C) (Oral)   Resp 18   Ht 6' 3" (1.905 m)   Wt 118.2 kg (260 lb 9.6 oz)   SpO2 100%   BMI 32.57 kg/m²     Physical Exam  Constitutional:       Appearance: Normal appearance. He is obese.   HENT:      Head: Normocephalic and atraumatic.        Comments: Wart-like lesion on right upper lip.   Wart-like lesion on anterior neck  Eyes:      Conjunctiva/sclera: Conjunctivae normal.   Cardiovascular:      Rate and Rhythm: Normal rate and regular rhythm.      Heart sounds: Normal heart sounds.   Pulmonary:      Effort: Pulmonary effort is normal.      Breath sounds: Normal breath sounds.   Abdominal:      General: Bowel sounds are normal.   Musculoskeletal:         General: Normal range of motion.      Cervical back: Normal range of motion and neck supple.   Skin:     General: Skin is warm and dry.   Neurological:      Mental Status: He is alert.   Psychiatric:         Mood and Affect: Mood " normal.         Behavior: Behavior normal.          All recently obtained labs have been reviewed and discussed in detail with the patient.   Assessment     1. Type 2 diabetes mellitus with hyperglycemia, without long-term current use of insulin    2. Essential hypertension    3. Verrucous lesion of lip    4. Abnormal laboratory test         Plan     Problem List Items Addressed This Visit          ENT    Verrucous lesion of lip     Patient reported lesion on right upper lip that has been apparent for the past week and has since grown in size. Lesion appears to be verrucous in appearance. Patient denies burning, tingling, or pain in the affected area. Patient reported that lesion bleeds a lot when he pick at it. Similar lesion was noted on anterior neck. Patient stated that the neck lesion has been there for the past 4 months.  Plan:  -Patient was given the option to follow up with a dermatologist  -Patient was given information to the free clinic as another option for expert advise and further management              Cardiac/Vascular    Essential hypertension     Patient's recorded BP today is 138/80. Patient reported that  home BP has been uncontrolled. Last seen at cardiologist office one week ago where he was stitched from Metoprolol to carvedilol.  Plan:  -Patient to monitor their blood pressure daily and maintain a log of readings for review at the next visit.  -Evaluate and reassess blood pressure control at the next follow-up appointment. Review home BP log to determine the need for further medication adjustment or additional interventions. Repeat CBC  -Assess Reinforce the importance of medication adherence to carvedilol as prescribed.  Encourage lifestyle modifications, including a low-sodium diet, regular exercise, and stress management, to support blood pressure control.  -Treat:Continue carvedilol per cardiologists recommendation.  Consider repeating blood pressure evaluation in-clinic if home readings  remain consistently uncontrolled despite adherence to therapy.            Endocrine    Type 2 diabetes mellitus with hyperglycemia, without long-term current use of insulin - Primary     Repeated A1c from last visit was 8.7% Patient reports at home blood sugar reading in high 200s and 300s. Based on these finding further adjustment in medication is warranted for optimal blood sugar control.  Plan:  Order Metformin 500mg tab to medication regiment  Patient to continue at home blood sugar checks  Repeat A1c in 3 months to reassess blood sugar control         Relevant Medications    metFORMIN (GLUCOPHAGE) 500 MG tablet     Other Visit Diagnoses       Abnormal laboratory test        Relevant Orders    Lipid Panel (Completed)    Comprehensive Metabolic Panel (Completed)              All orders:  Orders Placed This Encounter    Lipid Panel    Comprehensive Metabolic Panel    metFORMIN (GLUCOPHAGE) 500 MG tablet    omega-3 fatty acids-fish oil 340-1,000 mg Cap          No follow-ups on file.    Instructed patient that if symptoms fail to improve or worsen patient should seek immediate medical attention or report to the nearest emergency department. Patient expressed verbal agreement and understanding to this plan of care.   Marycarmen Hendricks MD  Family Medicine Residency PGY-1  [facility]

## 2024-12-05 PROBLEM — K13.0 VERRUCOUS LESION OF LIP: Status: ACTIVE | Noted: 2024-12-05

## 2024-12-05 NOTE — ASSESSMENT & PLAN NOTE
Repeated A1c from last visit was 8.7% Patient reports at home blood sugar reading in high 200s and 300s. Based on these finding further adjustment in medication is warranted for optimal blood sugar control.  Plan:  Order Metformin 500mg tab to medication regiment  Patient to continue at home blood sugar checks  Repeat A1c in 3 months to reassess blood sugar control

## 2024-12-05 NOTE — ASSESSMENT & PLAN NOTE
Patient reported lesion on right upper lip that has been apparent for the past week and has since grown in size. Lesion appears to be verrucous in appearance. Patient denies burning, tingling, or pain in the affected area. Patient reported that lesion bleeds a lot when he pick at it. Similar lesion was noted on anterior neck. Patient stated that the neck lesion has been there for the past 4 months.  Plan:  -Patient was given the option to follow up with a dermatologist  -Patient was given information to the free clinic as another option for expert advise and further management

## 2024-12-05 NOTE — ASSESSMENT & PLAN NOTE
Patient's recorded BP today is 138/80. Patient reported that  home BP has been uncontrolled. Last seen at cardiologist office one week ago where he was stitched from Metoprolol to carvedilol.  Plan:  -Patient to monitor their blood pressure daily and maintain a log of readings for review at the next visit.  -Evaluate and reassess blood pressure control at the next follow-up appointment. Review home BP log to determine the need for further medication adjustment or additional interventions. Repeat CBC  -Assess Reinforce the importance of medication adherence to carvedilol as prescribed.  Encourage lifestyle modifications, including a low-sodium diet, regular exercise, and stress management, to support blood pressure control.  -Treat:Continue carvedilol per cardiologists recommendation.  Consider repeating blood pressure evaluation in-clinic if home readings remain consistently uncontrolled despite adherence to therapy.

## 2024-12-06 ENCOUNTER — TELEPHONE (OUTPATIENT)
Dept: CARDIOLOGY | Facility: CLINIC | Age: 59
End: 2024-12-06

## 2024-12-06 NOTE — TELEPHONE ENCOUNTER
----- Message from Danitza sent at 11/25/2024  4:21 PM CST -----  Regarding: PT CALL BACK  Who Called: Adam Parsons    Caller is requesting assistance/information from provider's office.    - [facility] CALLED REGARDING  THE PT MEDICATIONS      Preferred Method of Contact: Phone Call  Patient's Preferred Phone Number on File: [phone]

## 2024-12-13 ENCOUNTER — HOSPITAL ENCOUNTER (EMERGENCY)
Facility: HOSPITAL | Age: 59
Discharge: HOME | End: 2024-12-13

## 2024-12-13 VITALS
SYSTOLIC BLOOD PRESSURE: 113 MMHG | HEIGHT: 75 IN | WEIGHT: 266 LBS | HEART RATE: 105 BPM | DIASTOLIC BLOOD PRESSURE: 76 MMHG | TEMPERATURE: 98 F | RESPIRATION RATE: 16 BRPM | BODY MASS INDEX: 33.07 KG/M2 | OXYGEN SATURATION: 96 %

## 2024-12-13 DIAGNOSIS — K13.0 VERRUCOUS LESION OF LIP: Primary | ICD-10-CM

## 2024-12-13 PROCEDURE — 99281 EMR DPT VST MAYX REQ PHY/QHP: CPT

## 2024-12-13 NOTE — DISCHARGE INSTRUCTIONS
Dermatology referral placed; Will send message to referral nurse to help with process. Abstain from picking with area. F/u with PCP in 48-72 hours. Return to ED if any new or worsening of symptoms occur

## 2024-12-14 NOTE — ED PROVIDER NOTES
Encounter Date: 12/13/2024       History     Chief Complaint   Patient presents with    Mouth Injury     Pt presents to ED via POV with c/o bump showing up on lip around August and pt reports pulling scab off today and bleeding went on for about 3 hours. Bleeding controlled.      58-year-old male presents to ED with complaint of mouth sore.  Patient states that sore to top lip started off as a small pimple and over the course of 4 months has grown larger.  He reports area intermittently scabs and he pulls it off, and on today when he pulled off scab his lip bled for approximately 3 hours.  Denies fever, chills, pain to area, abnormal weight loss/gain.  Patient reports history of tobacco use.    The history is provided by the patient.     Review of patient's allergies indicates:  No Known Allergies  Past Medical History:   Diagnosis Date    Diabetes mellitus, type 2     Hyperlipidemia     Hypertension     NSTEMI (non-ST elevated myocardial infarction) 08/02/2024     Past Surgical History:   Procedure Laterality Date    ANGIOGRAM, CORONARY, WITH LEFT HEART CATHETERIZATION N/A 8/2/2024    Procedure: Angiogram, Coronary, with Left Heart Cath;  Surgeon: Blake Hodgson MD;  Location: [facility] CATH LAB;  Service: Cardiology;  Laterality: N/A;    PERCUTANEOUS CORONARY INTERVENTION, ARTERY N/A 8/2/2024    Procedure: Percutaneous coronary intervention;  Surgeon: Blake Hodgson MD;  Location: [facility] CATH LAB;  Service: Cardiology;  Laterality: N/A;     Family History   Problem Relation Name Age of Onset    Heart disease Father      Diabetes Daughter      Heart disease Daughter      Diabetes Maternal Uncle       Social History     Tobacco Use    Smoking status: Former     Types: Cigarettes    Smokeless tobacco: Never   Substance Use Topics    Alcohol use: Not Currently    Drug use: Never     Review of Systems   Constitutional:  Negative for chills and fever.   HENT:  Positive for mouth sores. Negative for  congestion.    Eyes:  Negative for photophobia and visual disturbance.   Respiratory:  Negative for cough and shortness of breath.    Gastrointestinal:  Negative for nausea and vomiting.   Musculoskeletal:  Negative for arthralgias and gait problem.   Skin:  Positive for color change and wound.   Neurological:  Negative for dizziness and headaches.   Hematological:  Negative for adenopathy. Does not bruise/bleed easily.   Psychiatric/Behavioral:  Negative for agitation and confusion.    All other systems reviewed and are negative.      Physical Exam     Initial Vitals [12/13/24 1604]   BP Pulse Resp Temp SpO2   113/76 105 16 97.5 °F (36.4 °C) 96 %      MAP       --         Physical Exam    Nursing note and vitals reviewed.  Constitutional: He appears well-developed and well-nourished.   HENT:   Head: Normocephalic and atraumatic. Mouth/Throat:       Eyes: EOM are normal. Pupils are equal, round, and reactive to light.   Neck: Neck supple.   Normal range of motion.  Cardiovascular:  Normal rate and regular rhythm.           No murmur heard.  Pulmonary/Chest: He has no wheezes. He has no rhonchi.   Abdominal: Abdomen is soft. He exhibits no distension. There is no abdominal tenderness.   Musculoskeletal:         General: No tenderness or edema.      Cervical back: Normal range of motion and neck supple.     Lymphadenopathy:     He has no cervical adenopathy.   Neurological: He is alert and oriented to person, place, and time. No cranial nerve deficit or sensory deficit.   Skin: Skin is warm and dry. Capillary refill takes less than 2 seconds.   Psychiatric: He has a normal mood and affect. Thought content normal.         Medical Screening Exam   See Full Note    ED Course   Procedures  Labs Reviewed - No data to display       Imaging Results    None          Medications - No data to display  Medical Decision Making  58-year-old male presents to ED with complaint of mouth sore.  Patient states that sore to top lip  started off as a small pimple and over the course of 4 months has grown larger.  He reports area intermittently scabs and he pulls it off, and on today when he pulled off scab his lip bled for approximately 3 hours.  Denies fever, chills, pain to area, abnormal weight loss/gain.  Patient reports history of tobacco use.    Labs, diagnostics not indicated for visit   Review of records notes patient seen at PCP's office with suspicions of verrucous lesion of lip  Dermatology referral placed                                        Clinical Impression:   Final diagnoses:  [K13.0] Verrucous lesion of lip (Primary)        ED Disposition Condition    Discharge Stable          ED Prescriptions    None       Follow-up Information    None          Camille Green, P  12/14/24 0018

## 2024-12-19 DIAGNOSIS — E11.65 TYPE 2 DIABETES MELLITUS WITH HYPERGLYCEMIA, WITHOUT LONG-TERM CURRENT USE OF INSULIN: ICD-10-CM

## 2024-12-19 RX ORDER — GLIMEPIRIDE 2 MG/1
2 TABLET ORAL
Qty: 90 TABLET | Refills: 2 | Status: SHIPPED | OUTPATIENT
Start: 2024-12-19 | End: 2025-12-19

## 2024-12-19 RX ORDER — GLIMEPIRIDE 2 MG/1
2 TABLET ORAL
Qty: 90 TABLET | Refills: 1 | Status: SHIPPED | OUTPATIENT
Start: 2024-12-19 | End: 2024-12-19

## 2025-01-09 ENCOUNTER — HOSPITAL ENCOUNTER (EMERGENCY)
Facility: HOSPITAL | Age: 60
Discharge: HOME | End: 2025-01-09

## 2025-01-09 VITALS
BODY MASS INDEX: 32.2 KG/M2 | TEMPERATURE: 98 F | DIASTOLIC BLOOD PRESSURE: 78 MMHG | OXYGEN SATURATION: 99 % | WEIGHT: 259 LBS | RESPIRATION RATE: 21 BRPM | HEART RATE: 112 BPM | HEIGHT: 75 IN | SYSTOLIC BLOOD PRESSURE: 170 MMHG

## 2025-01-09 DIAGNOSIS — R09.1 PLEURISY: Primary | ICD-10-CM

## 2025-01-09 DIAGNOSIS — R07.81 RIB PAIN: ICD-10-CM

## 2025-01-09 DIAGNOSIS — R05.9 COUGH: ICD-10-CM

## 2025-01-09 DIAGNOSIS — J06.9 UPPER RESPIRATORY TRACT INFECTION, UNSPECIFIED TYPE: ICD-10-CM

## 2025-01-09 PROCEDURE — 99284 EMERGENCY DEPT VISIT MOD MDM: CPT | Mod: 25

## 2025-01-09 PROCEDURE — 63600175 PHARM REV CODE 636 W HCPCS: Mod: TB | Performed by: NURSE PRACTITIONER

## 2025-01-09 PROCEDURE — 96372 THER/PROPH/DIAG INJ SC/IM: CPT | Performed by: NURSE PRACTITIONER

## 2025-01-09 RX ORDER — AZITHROMYCIN 250 MG/1
TABLET, FILM COATED ORAL
Qty: 6 TABLET | Refills: 0 | Status: SHIPPED | OUTPATIENT
Start: 2025-01-09 | End: 2025-01-14

## 2025-01-09 RX ORDER — KETOROLAC TROMETHAMINE 30 MG/ML
30 INJECTION, SOLUTION INTRAMUSCULAR; INTRAVENOUS
Status: COMPLETED | OUTPATIENT
Start: 2025-01-09 | End: 2025-01-09

## 2025-01-09 RX ORDER — CHLORPHENIRAMINE MALEATE AND PHENYLEPHRINE HYDROCHLORIDE 4; 10 MG/1; MG/1
1 TABLET, COATED ORAL EVERY 6 HOURS PRN
Qty: 20 TABLET | Refills: 0 | Status: SHIPPED | OUTPATIENT
Start: 2025-01-09 | End: 2025-01-19

## 2025-01-09 RX ORDER — METHYLPREDNISOLONE ACETATE 80 MG/ML
40 INJECTION, SUSPENSION INTRA-ARTICULAR; INTRALESIONAL; INTRAMUSCULAR; SOFT TISSUE
Status: COMPLETED | OUTPATIENT
Start: 2025-01-09 | End: 2025-01-09

## 2025-01-09 RX ORDER — ONDANSETRON 4 MG/1
4 TABLET, ORALLY DISINTEGRATING ORAL EVERY 8 HOURS PRN
Qty: 15 TABLET | Refills: 0 | Status: SHIPPED | OUTPATIENT
Start: 2025-01-09

## 2025-01-09 RX ADMIN — METHYLPREDNISOLONE ACETATE 40 MG: 80 INJECTION, SUSPENSION INTRA-ARTICULAR; INTRALESIONAL; INTRAMUSCULAR; SOFT TISSUE at 05:01

## 2025-01-09 RX ADMIN — KETOROLAC TROMETHAMINE 30 MG: 30 INJECTION, SOLUTION INTRAMUSCULAR at 05:01

## 2025-01-09 NOTE — ED PROVIDER NOTES
Encounter Date: 1/9/2025       History     Chief Complaint   Patient presents with    Rib pain     Pt c/o right sided rib pain for several days. States he started coughing last night.      60 y/o AAM presents to the emergency department with c/o right side rib pain that he states started a couple of days ago. He denies injury, trauma, fever or chills. He denies chest pain, shortness of breath, n/v or diaphoresis. He states his pain is worse with deep breathing, certain movements and coughing. He states he began coughing last night but is unproductive. He reports has had some upper airway congestion and drainage. He has not taking anything for his symptoms. He denies nicotine, alcohol or illicit drug use.     The history is provided by the patient.     Review of patient's allergies indicates:  No Known Allergies  Past Medical History:   Diagnosis Date    Diabetes mellitus, type 2     Hyperlipidemia     Hypertension     NSTEMI (non-ST elevated myocardial infarction) 08/02/2024     Past Surgical History:   Procedure Laterality Date    ANGIOGRAM, CORONARY, WITH LEFT HEART CATHETERIZATION N/A 8/2/2024    Procedure: Angiogram, Coronary, with Left Heart Cath;  Surgeon: Blake Hodgson MD;  Location: [facility] CATH LAB;  Service: Cardiology;  Laterality: N/A;    PERCUTANEOUS CORONARY INTERVENTION, ARTERY N/A 8/2/2024    Procedure: Percutaneous coronary intervention;  Surgeon: Blake Hodgson MD;  Location: [facility] CATH LAB;  Service: Cardiology;  Laterality: N/A;     Family History   Problem Relation Name Age of Onset    Heart disease Father      Diabetes Daughter      Heart disease Daughter      Diabetes Maternal Uncle       Social History     Tobacco Use    Smoking status: Former     Types: Cigarettes    Smokeless tobacco: Never   Substance Use Topics    Alcohol use: Not Currently    Drug use: Never     Review of Systems   All other systems reviewed and are negative.      Physical Exam     Initial Vitals [01/09/25  1555]   BP Pulse Resp Temp SpO2   (!) 170/78 (!) 112 (!) 21 98.2 °F (36.8 °C) 99 %      MAP       --         Physical Exam    Constitutional: He appears well-developed and well-nourished. He is Obese . He is cooperative.  Non-toxic appearance.   HENT:   Nose: Mucosal edema and rhinorrhea present. Right sinus exhibits frontal sinus tenderness. Left sinus exhibits frontal sinus tenderness. Mouth/Throat: Mucous membranes are normal. Posterior oropharyngeal erythema present.   PND   Cardiovascular:  Normal rate, regular rhythm and normal heart sounds.           Pulmonary/Chest: Effort normal and breath sounds normal.   Abdominal: Abdomen is soft. Bowel sounds are normal. There is no abdominal tenderness.     Neurological: He is alert and oriented to person, place, and time. GCS eye subscore is 4. GCS verbal subscore is 5. GCS motor subscore is 6.   Skin: Skin is warm, dry and intact. Capillary refill takes less than 2 seconds.         Medical Screening Exam   See Full Note    ED Course   Procedures  Labs Reviewed - No data to display       Imaging Results              X-Ray Chest PA And Lateral (Final result)  Result time 01/09/25 16:48:19      Final result by Jaswant Gomez MD (01/09/25 16:48:19)                   Impression:      No acute abnormality.      Electronically signed by: Jaswant Gomez MD  Date:    01/09/2025  Time:    16:48               Narrative:    EXAMINATION:  XR CHEST PA AND LATERAL    CLINICAL HISTORY:  Cough, unspecified    TECHNIQUE:  PA and lateral views of the chest were performed.    COMPARISON:  11/04/2024    FINDINGS:  Mild elevation LEFT hemidiaphragm.The lungs are clear, with normal appearance of pulmonary vasculature.No pleural effusion.No pneumothorax.    The cardiac silhouette is normal in size. The hilar and mediastinal contours are unremarkable.    Bones are intact.                                       Medications   ketorolac injection 30 mg (30 mg Intramuscular Given 1/9/25  1743)   methylPREDNISolone acetate injection 40 mg (40 mg Intramuscular Given 1/9/25 1743)     Medical Decision Making  60 y/o AANINA presents to the emergency department with c/o right side rib pain that he states started a couple of days ago. He denies injury, trauma, fever or chills. He denies chest pain, shortness of breath, n/v or diaphoresis. He states his pain is worse with deep breathing, certain movements and coughing. He states he began coughing last night but is unproductive. He reports has had some upper airway congestion and drainage. He has not taking anything for his symptoms. He denies nicotine, alcohol or illicit drug use.     Problems Addressed:  Rib pain:     Details: Pt afebrile, non toxic appearing. CXR negative. Toradol given for pain. Steroid for inflammation. Stressed importance of monitoring glucose closely. Rx Zpack, ed a hist; counseled on use and supportive measures. Strict f/u instructions given. Warning s/s discussed and return precautions given; the patient has v/u.      Amount and/or Complexity of Data Reviewed  Radiology: ordered.    Risk  OTC drugs.  Prescription drug management.                                      Clinical Impression:   Final diagnoses:  [R05.9] Cough  [R07.81] Rib pain  [R09.1] Pleurisy (Primary)  [J06.9] Upper respiratory tract infection, unspecified type        ED Disposition Condition    Discharge Stable          ED Prescriptions       Medication Sig Dispense Start Date End Date Auth. Provider    azithromycin (Z-LALY) 250 MG tablet Take 2 tablets by mouth on day 1; Take 1 tablet by mouth on days 2-5 6 tablet 1/9/2025 1/14/2025 Mame Dugan FNP    chlorpheniramine-phenylephrine (ED A-HIST) 4-10 mg per tablet Take 1 tablet by mouth every 6 (six) hours as needed for Congestion. 20 tablet 1/9/2025 1/19/2025 Mame Dugan FNP    ondansetron (ZOFRAN-ODT) 4 MG TbDL Take 1 tablet (4 mg total) by mouth every 8 (eight) hours as needed (nausea, vomiting). 15 tablet  1/9/2025 -- Mame Dugan FNP          Follow-up Information       Follow up With Specialties Details Why Contact Info    Marycarmen Hendricks MD Family Medicine  As needed [address] [place] [address]  [phone]               Mame Dugan FNP  01/11/25 8336

## 2025-01-09 NOTE — DISCHARGE INSTRUCTIONS
Use prescriptions as directed. Alternate Tylenol and Ibuprofen as needed for pain. Drink plenty of fluids, especially water. Keep a close check on your glucose for the next couple of days. Follow up with your primary care provider if no improvement or otherwise as needed. Return to the ED for worsening signs and symptoms or otherwise as needed.

## 2025-02-07 ENCOUNTER — TELEPHONE (OUTPATIENT)
Dept: CARDIOLOGY | Facility: CLINIC | Age: 60
End: 2025-02-07

## 2025-02-07 NOTE — TELEPHONE ENCOUNTER
Spoke with the patient on today, to get clarity if his PCP has already sent in refills for his atorvastatin before I put in another refill request.     In the system its showing that the medication atorvastatin already has active orders.     Patient explained that he will his pharmacy first and will give me a call back.----- Message from Med Assistant Roman sent at 2/7/2025  9:14 AM CST -----  Who Called: Adam Parsons    Refill or New Rx:Refill  RX Name and Strength:atorvastatin (LIPITOR) 40 MG tablet  How is the patient currently taking it? (ex. 1XDay):1x day   Is this a 30 day or 90 day RX:90  List of preferred pharmacies on file (remove unneeded): [facility]    Preferred Method of Contact: Phone Call  Patient's Preferred Phone Number on File: [phone]   Best Call Back Number, if different:  Additional Information: refill

## 2025-02-11 RX ORDER — ATORVASTATIN CALCIUM 40 MG/1
40 TABLET, FILM COATED ORAL
Qty: 90 TABLET | Refills: 0 | OUTPATIENT
Start: 2025-02-11

## 2025-02-11 RX ORDER — ATORVASTATIN CALCIUM 40 MG/1
40 TABLET, FILM COATED ORAL DAILY
Qty: 30 TABLET | Refills: 3 | Status: SHIPPED | OUTPATIENT
Start: 2025-02-11 | End: 2025-06-11

## 2025-03-03 DIAGNOSIS — E11.65 TYPE 2 DIABETES MELLITUS WITH HYPERGLYCEMIA, WITHOUT LONG-TERM CURRENT USE OF INSULIN: Primary | ICD-10-CM

## 2025-03-03 RX ORDER — METFORMIN HYDROCHLORIDE 500 MG/1
500 TABLET ORAL 2 TIMES DAILY WITH MEALS
Qty: 180 TABLET | Refills: 0 | Status: SHIPPED | OUTPATIENT
Start: 2025-03-03 | End: 2025-06-01

## 2025-03-13 RX ORDER — LOSARTAN POTASSIUM 100 MG/1
100 TABLET ORAL
Qty: 90 TABLET | Refills: 1 | Status: SHIPPED | OUTPATIENT
Start: 2025-03-13

## 2025-03-26 ENCOUNTER — TELEPHONE (OUTPATIENT)
Dept: DERMATOLOGY | Facility: CLINIC | Age: 60
End: 2025-03-26

## 2025-05-16 ENCOUNTER — PATIENT OUTREACH (OUTPATIENT)
Facility: HOSPITAL | Age: 60
End: 2025-05-16

## 2025-05-16 NOTE — PROGRESS NOTES
Population Health Chart Review & Patient Outreach Details  *2025 Telephone outreach, no answer no vm set up, sent my chart for pt to call or schedule self for follow up with labs that are over due**     Further Action Needed If Patient Returns Outreach:        Health Maintenance Due   Topic Date Due    Hepatitis C Screening  Never done    Foot Exam  Never done    Diabetic Eye Exam  Never done    HIV Screening  Never done    TETANUS VACCINE  Never done    Pneumococcal Vaccines (Age 50+) (1 of 2 - PCV) Never done    Colorectal Cancer Screening  Never done    Shingles Vaccine (1 of 2) Never done    COVID-19 Vaccine ( season) Never done    Hemoglobin A1c  2025          Updates Requested / Reviewed:     [x]  Care Everywhere    [x]     []  External Sources ([facility], [facility], [facility], etc.)    [x] [facility]   [x] [facility]   [] Other:    []  Care Team Updated   []  Removed  or Duplicate Orders   []  Immunization Reconciliation Completed / Queried    [] [place]   [] [place]   [] [place]   [] [place]      Health Maintenance Topics Addressed and Outreach Outcomes / Actions Taken:             Breast Cancer Screening []  Mammogram Order Placed    []  Mammogram Screening Scheduled    []  External Records Requested & Care Team Updated if Applicable    []  External Records Uploaded & Care Team Updated if Applicable    []  Pt Declined Scheduling Mammogram    []  Pt Will Schedule with External Provider / Order Routed & Care Team Updated if Applicable              Cervical Cancer Screening []  Pap Smear Scheduled in Primary Care or OBGYN    []  External Records Requested & Care Team Updated if Applicable       []  External Records Uploaded, Care Team Updated, & History Updated if Applicable    []  Patient Declined Scheduling Pap Smear    []  Patient Will Schedule with External Provider & Care Team Updated if Applicable                  Colorectal Cancer Screening []  Colonoscopy Case Request /  Referral / Home Test Order Placed    []  External Records Requested & Care Team Updated if Applicable    []  External Records Uploaded, Care Team Updated, & History Updated if Applicable    []  Patient Declined Completing Colon Cancer Screening    []  Patient Will Schedule with External Provider & Care Team Updated if Applicable    []  Fit Kit Mailed (add the SmartPhrase under additional notes)    []  Reminded Patient to Complete Home Test                Diabetic Eye Exam []  Eye Exam Screening Order Placed    []  Eye Camera Scheduled or Optometry/Ophthalmology Referral Placed    []  External Records Requested & Care Team Updated if Applicable    []  External Records Uploaded, Care Team Updated, & History Updated if Applicable    []  Patient Declined Scheduling Eye Exam    []  Patient Will Schedule with External Provider & Care Team Updated if Applicable             Blood Pressure Control []  Primary Care Follow Up Visit Scheduled     []  Remote Blood Pressure Reading Captured    []  Patient Declined Remote Reading or Scheduling Appt - Escalated to PCP    []  Patient Will Call Back or Send Portal Message with Reading                 HbA1c & Other Labs []  Overdue Lab(s) Ordered    []  Overdue Lab(s) Scheduled    []  External Records Uploaded & Care Team Updated if Applicable    []  Primary Care Follow Up Visit Scheduled     []  Reminded Patient to Complete A1c Home Test    [x]  Patient Declined Scheduling Labs or Will Call Back to Schedule    []  Patient Will Schedule with External Provider / Order Routed, & Care Team Updated if Applicable           Primary Care Appointment []  Primary Care Appt Scheduled    [x]  Patient Declined Scheduling or Will Call Back to Schedule    []  Pt Established with External Provider, Updated Care Team, & Informed Pt to Notify Payor if Applicable           Medication Adherence /    Statin Use []  Primary Care Appointment Scheduled    []  Patient Reminded to  Prescription    []   Patient Declined, Provider Notified if Needed    []  Sent Provider Message to Review to Evaluate Pt for Statin, Add Exclusion Dx Codes, Document   Exclusion in Problem List, Change Statin Intensity Level to Moderate or High Intensity if Applicable                Osteoporosis Screening []  Dexa Order Placed    []  Dexa Appointment Scheduled    []  External Records Requested & Care Team Updated    []  External Records Uploaded, Care Team Updated, & History Updated if Applicable    []  Patient Declined Scheduling Dexa or Will Call Back to Schedule    []  Patient Will Schedule with External Provider / Order Routed & Care Team Updated if Applicable       Additional Notes:

## 2025-05-27 ENCOUNTER — OFFICE VISIT (OUTPATIENT)
Dept: FAMILY MEDICINE | Facility: CLINIC | Age: 60
End: 2025-05-27

## 2025-05-27 ENCOUNTER — OFFICE VISIT (OUTPATIENT)
Dept: CARDIOLOGY | Facility: CLINIC | Age: 60
End: 2025-05-27

## 2025-05-27 VITALS
DIASTOLIC BLOOD PRESSURE: 103 MMHG | OXYGEN SATURATION: 99 % | TEMPERATURE: 98 F | WEIGHT: 261.63 LBS | HEART RATE: 100 BPM | SYSTOLIC BLOOD PRESSURE: 164 MMHG | HEIGHT: 75 IN | BODY MASS INDEX: 32.53 KG/M2 | RESPIRATION RATE: 18 BRPM

## 2025-05-27 VITALS
OXYGEN SATURATION: 98 % | WEIGHT: 264 LBS | BODY MASS INDEX: 32.83 KG/M2 | HEART RATE: 102 BPM | HEIGHT: 75 IN | SYSTOLIC BLOOD PRESSURE: 152 MMHG | DIASTOLIC BLOOD PRESSURE: 82 MMHG

## 2025-05-27 DIAGNOSIS — I10 ESSENTIAL HYPERTENSION: ICD-10-CM

## 2025-05-27 DIAGNOSIS — E11.65 TYPE 2 DIABETES MELLITUS WITH HYPERGLYCEMIA, WITHOUT LONG-TERM CURRENT USE OF INSULIN: Primary | ICD-10-CM

## 2025-05-27 DIAGNOSIS — Z11.4 ENCOUNTER FOR SCREENING FOR HIV: ICD-10-CM

## 2025-05-27 DIAGNOSIS — E78.1 HYPERTRIGLYCERIDEMIA: ICD-10-CM

## 2025-05-27 DIAGNOSIS — I10 ESSENTIAL HYPERTENSION: Primary | ICD-10-CM

## 2025-05-27 DIAGNOSIS — E11.9 DIABETES MELLITUS WITHOUT COMPLICATION: ICD-10-CM

## 2025-05-27 DIAGNOSIS — Z12.11 SCREENING FOR MALIGNANT NEOPLASM OF COLON: ICD-10-CM

## 2025-05-27 DIAGNOSIS — Z11.59 NEED FOR HEPATITIS C SCREENING TEST: ICD-10-CM

## 2025-05-27 LAB
ALBUMIN SERPL BCP-MCNC: 4.2 G/DL (ref 3.5–5)
ALBUMIN/GLOB SERPL: 1.1 {RATIO}
ALP SERPL-CCNC: 79 U/L (ref 40–150)
ALT SERPL W P-5'-P-CCNC: 54 U/L
ANION GAP SERPL CALCULATED.3IONS-SCNC: 17 MMOL/L (ref 7–16)
AST SERPL W P-5'-P-CCNC: 36 U/L (ref 11–45)
BILIRUB SERPL-MCNC: 0.5 MG/DL
BUN SERPL-MCNC: 22 MG/DL (ref 8–26)
BUN/CREAT SERPL: 15 (ref 6–20)
CALCIUM SERPL-MCNC: 10.7 MG/DL (ref 8.4–10.2)
CHLORIDE SERPL-SCNC: 105 MMOL/L (ref 98–107)
CHOLEST SERPL-MCNC: 143 MG/DL
CHOLEST/HDLC SERPL: 6.5 {RATIO}
CO2 SERPL-SCNC: 23 MMOL/L (ref 22–29)
CREAT SERPL-MCNC: 1.44 MG/DL (ref 0.72–1.25)
EGFR (NO RACE VARIABLE) (RUSH/TITUS): 57 ML/MIN/1.73M2
EST. AVERAGE GLUCOSE BLD GHB EST-MCNC: 143 MG/DL
GLOBULIN SER-MCNC: 3.7 G/DL (ref 2–4)
GLUCOSE SERPL-MCNC: 176 MG/DL (ref 74–100)
HBA1C MFR BLD HPLC: 6.6 %
HCV AB SER QL: NORMAL
HDLC SERPL-MCNC: 22 MG/DL (ref 35–60)
HIV 1+O+2 AB SERPL QL: NORMAL
LDLC SERPL CALC-MCNC: 55 MG/DL
LDLC/HDLC SERPL: 2.5 {RATIO}
NONHDLC SERPL-MCNC: 121 MG/DL
PLATELET MORPHOLOGY: ABNORMAL
POLYCHROMASIA BLD QL SMEAR: ABNORMAL
POTASSIUM SERPL-SCNC: 4.2 MMOL/L (ref 3.5–5.1)
PROT SERPL-MCNC: 7.9 G/DL (ref 6.4–8.3)
SODIUM SERPL-SCNC: 141 MMOL/L (ref 136–145)
TARGETS BLD QL SMEAR: ABNORMAL
TRIGL SERPL-MCNC: 329 MG/DL (ref 34–140)
VLDLC SERPL-MCNC: 66 MG/DL

## 2025-05-27 PROCEDURE — 99214 OFFICE O/P EST MOD 30 MIN: CPT | Mod: GC,,, | Performed by: SPECIALIST

## 2025-05-27 PROCEDURE — 87389 HIV-1 AG W/HIV-1&-2 AB AG IA: CPT | Mod: ,,, | Performed by: CLINICAL MEDICAL LABORATORY

## 2025-05-27 PROCEDURE — 85025 COMPLETE CBC W/AUTO DIFF WBC: CPT | Mod: ,,, | Performed by: CLINICAL MEDICAL LABORATORY

## 2025-05-27 PROCEDURE — 83036 HEMOGLOBIN GLYCOSYLATED A1C: CPT | Mod: ,,, | Performed by: CLINICAL MEDICAL LABORATORY

## 2025-05-27 PROCEDURE — 99214 OFFICE O/P EST MOD 30 MIN: CPT | Mod: S$PBB,,, | Performed by: INTERNAL MEDICINE

## 2025-05-27 PROCEDURE — 99999 PR PBB SHADOW E&M-EST. PATIENT-LVL IV: CPT | Mod: PBBFAC,,, | Performed by: INTERNAL MEDICINE

## 2025-05-27 PROCEDURE — 80053 COMPREHEN METABOLIC PANEL: CPT | Mod: ,,, | Performed by: CLINICAL MEDICAL LABORATORY

## 2025-05-27 PROCEDURE — 80061 LIPID PANEL: CPT | Mod: ,,, | Performed by: CLINICAL MEDICAL LABORATORY

## 2025-05-27 PROCEDURE — 99214 OFFICE O/P EST MOD 30 MIN: CPT | Mod: PBBFAC | Performed by: INTERNAL MEDICINE

## 2025-05-27 PROCEDURE — 86803 HEPATITIS C AB TEST: CPT | Mod: ,,, | Performed by: CLINICAL MEDICAL LABORATORY

## 2025-05-27 RX ORDER — METFORMIN HYDROCHLORIDE 500 MG/1
500 TABLET ORAL 2 TIMES DAILY WITH MEALS
Qty: 180 TABLET | Refills: 0 | Status: SHIPPED | OUTPATIENT
Start: 2025-05-27 | End: 2025-08-25

## 2025-05-27 NOTE — ASSESSMENT & PLAN NOTE
BP today 164/103 mmHg. Is asymptomatic.   -Just had a visit with Cardiology this AM.  - This is my 1 st visit with the patient today.   -He is on Carvedilol 3.125 mg PO BID and Losartan 100 mg PO once daily.Endorses of taking his medications regularly and improved BP measurements than earlier.  -The patient reported that his blood pressure fluctuated at home, with some days being higher and others being normal. I will defer to PCP for recheck on his BP reading.   Instructed to bring BP log and medicines on next visit.   He will need modification on his BP meds if not well controlled.  -Follow up in 1 month .  -Ordered Lab work today.  
Last Hba1c 8.7% 6 months ago  Patient is on Metformin 500mg PO BID and Glimepiride 2mg PO once daily.  Will do HBA1C today.   -Current symptoms/problems include none and have been unchanged.   -Known diabetic complications: nephropathy  -Cardiovascular risk factors: advanced age , diabetes mellitus, dyslipidemia, and hypertension  - Instructed to maintain healthy diet and regular exercise.  - Follow up with PCP in 1 month.    
negative...

## 2025-05-27 NOTE — PROGRESS NOTES
Farheen Whiteside MD    [address], [place] [address]  Phone: [phone]     Subjective     Name: Adam Parsons   YOB: 1965 (59 y.o.)  MRN: [id number]  Visit Date: 5/27/2025   Chief Complaint: Diabetes (Room 7 f/u diabetes, needs AIC, needs a colonoscope scheduled ) and Health Maintenance (Patient needs a colonoscope scheduled )        HISTORY OF PRESENT ILLNESS:  The patient is 59 year old male presented to [facility] for follow up on DM2 and medication refill. He is also due for his lab work. Patient is established with Dr. Hendricks but was not able to see her yesterday because of his work schedule issues.  BP today 164/103 mmHg. Is asymptomatic. Just had a visit with Cardiology this AM.  This is my 1 st visit with the patient today. He is on Carvedilol 3.125 mg PO BID and Losartan 100 mg PO once daily.Endorses of taking his medications regularly and improved BP measurements than earlier.The patient reported that his blood pressure fluctuated at home, with some days being higher and others being normal. I will defer to PCP for recheck on his BP reading. Instructed to bring BP log and medicines on next visit. He will need modification on his BP meds if not well controlled.   Pertinent negatives include no swelling in the legs.  Will order lab work today. Care gaps- Colonoscopy ordered.          PAST MEDICAL HISTORY:  Significant Diagnoses - Patient  has a past medical history of Diabetes mellitus, type 2, Hyperlipidemia, Hypertension, and NSTEMI (non-ST elevated myocardial infarction) (08/02/2024).  Medications - Patient has a current medication list which includes the following long-term medication(s): aspirin, atorvastatin, carvedilol, clopidogrel, fenofibrate, glimepiride, losartan, metformin, nitroglycerin, and omega-3 fatty acids-fish oil.   Allergies - Patient has no known allergies.  Surgeries - Patient  has a past surgical history that includes ANGIOGRAM, CORONARY,  WITH LEFT HEART CATHETERIZATION (N/A, 8/2/2024) and percutaneous coronary intervention, artery (N/A, 8/2/2024).  Family History - Patient family history includes Diabetes in his daughter and maternal uncle; Heart disease in his daughter and father.      SOCIAL HISTORY:  Tobacco - Patient  reports that he has quit smoking. His smoking use included cigarettes. He has never used smokeless tobacco.   Alcohol - Patient  reports that he does not currently use alcohol.   Recreational Drugs - Patient  reports no history of drug use.       Review of Systems   Constitutional:  Negative for activity change, appetite change, chills and fever.   HENT: Negative.     Eyes: Negative.    Respiratory:  Negative for cough, shortness of breath and wheezing.    Cardiovascular:  Negative for chest pain and palpitations.   Gastrointestinal:  Negative for abdominal pain and change in bowel habit.   Genitourinary:  Negative for dysuria.   Neurological:  Negative for dizziness and headaches.   Psychiatric/Behavioral:  Negative for agitation.           Past Medical History:   Diagnosis Date    Diabetes mellitus, type 2     Hyperlipidemia     Hypertension     NSTEMI (non-ST elevated myocardial infarction) 08/02/2024        Review of patient's allergies indicates:  No Known Allergies     Past Surgical History:   Procedure Laterality Date    ANGIOGRAM, CORONARY, WITH LEFT HEART CATHETERIZATION N/A 8/2/2024    Procedure: Angiogram, Coronary, with Left Heart Cath;  Surgeon: Blake Hodgson MD;  Location: [facility] CATH LAB;  Service: Cardiology;  Laterality: N/A;    PERCUTANEOUS CORONARY INTERVENTION, ARTERY N/A 8/2/2024    Procedure: Percutaneous coronary intervention;  Surgeon: Blake Hodgson MD;  Location: [facility] CATH LAB;  Service: Cardiology;  Laterality: N/A;        Family History   Problem Relation Name Age of Onset    Heart disease Father      Diabetes Daughter      Heart disease Daughter      Diabetes Maternal Uncle    "      Current Outpatient Medications   Medication Instructions    aspirin (ECOTRIN) 81 mg, Oral, Daily    atorvastatin (LIPITOR) 40 mg, Oral, Daily    carvediloL (COREG) 3.125 mg, Oral, 2 times daily with meals    clopidogreL (PLAVIX) 75 mg, Oral, Daily    fenofibrate 160 mg, Oral, Daily    glimepiride (AMARYL) 2 mg, Oral, With breakfast    losartan (COZAAR) 100 mg, Oral    metFORMIN (GLUCOPHAGE) 500 mg, Oral, 2 times daily with meals    nitroGLYCERIN (NITROSTAT) 0.4 mg, Sublingual, Every 5 min PRN    omega-3 fatty acids-fish oil 340-1,000 mg Cap 1 capsule, Oral, Daily        Objective     BP (!) 164/103 (BP Location: Left arm, Patient Position: Sitting)   Pulse 100   Temp 97.5 °F (36.4 °C) (Oral)   Resp 18   Ht 6' 3" (1.905 m)   Wt 118.7 kg (261 lb 9.6 oz)   SpO2 99%   BMI 32.70 kg/m²     Physical Exam  Vitals and nursing note reviewed.   Constitutional:       Appearance: Normal appearance. He is obese.   HENT:      Head: Atraumatic.   Cardiovascular:      Rate and Rhythm: Tachycardia present.      Heart sounds: Normal heart sounds.   Pulmonary:      Breath sounds: Normal breath sounds.   Abdominal:      General: Bowel sounds are normal.      Palpations: Abdomen is soft.   Skin:     General: Skin is warm.      Capillary Refill: Capillary refill takes less than 2 seconds.   Neurological:      Mental Status: He is alert and oriented to person, place, and time.   Psychiatric:         Mood and Affect: Mood normal.          All recently obtained labs have been reviewed and discussed in detail with the patient.   Assessment     1. Type 2 diabetes mellitus with hyperglycemia, without long-term current use of insulin    2. Essential hypertension    3. Need for hepatitis C screening test    4. Encounter for screening for HIV    5. Hypertriglyceridemia    6. Screening for malignant neoplasm of colon         Plan     Problem List Items Addressed This Visit       Essential hypertension    BP today 164/103 mmHg. Is " asymptomatic.   -Just had a visit with Cardiology this AM.  - This is my 1 st visit with the patient today.   -He is on Carvedilol 3.125 mg PO BID and Losartan 100 mg PO once daily.Endorses of taking his medications regularly and improved BP measurements than earlier.  -The patient reported that his blood pressure fluctuated at home, with some days being higher and others being normal. I will defer to PCP for recheck on his BP reading.   Instructed to bring BP log and medicines on next visit.   He will need modification on his BP meds if not well controlled.  -Follow up in 1 month .  -Ordered Lab work today.         Hypertriglyceridemia    Relevant Orders    Lipid Panel    Type 2 diabetes mellitus with hyperglycemia, without long-term current use of insulin - Primary    Last Hba1c 8.7% 6 months ago  Patient is on Metformin 500mg PO BID and Glimepiride 2mg PO once daily.  Will do HBA1C today.   -Current symptoms/problems include none and have been unchanged.   -Known diabetic complications: nephropathy  -Cardiovascular risk factors: advanced age , diabetes mellitus, dyslipidemia, and hypertension  - Instructed to maintain healthy diet and regular exercise.  - Follow up with PCP in 1 month.           Relevant Medications    metFORMIN (GLUCOPHAGE) 500 MG tablet    Other Relevant Orders    Hemoglobin A1C    CBC Auto Differential    Comprehensive Metabolic Panel    Need for hepatitis C screening test    Relevant Orders    Hepatitis C Antibody    Encounter for screening for HIV    Relevant Orders    HIV 1/2 Ag/Ab (4th Gen)    Screening for malignant neoplasm of colon    Relevant Orders    Colonoscopy         All orders:  Orders Placed This Encounter    Hemoglobin A1C    CBC Auto Differential    Comprehensive Metabolic Panel    Hepatitis C Antibody    HIV 1/2 Ag/Ab (4th Gen)    Lipid Panel    CBC with Differential    metFORMIN (GLUCOPHAGE) 500 MG tablet    Colonoscopy          Follow up in about 1 month (around 6/27/2025)  for Follow up with Dr. Hendricks in 1 month-Recheck BP and BP meds recons..    Instructed patient that if symptoms fail to improve or worsen patient should seek immediate medical attention or report to the nearest emergency department. Patient expressed verbal agreement and understanding to this plan of care.   Farheen Whiteside MD  Family Medicine Residency PGY-2   [facility]

## 2025-05-28 ENCOUNTER — RESULTS FOLLOW-UP (OUTPATIENT)
Dept: HEPATOLOGY | Facility: HOSPITAL | Age: 60
End: 2025-05-28

## 2025-05-28 LAB
BASOPHILS # BLD AUTO: 0.06 K/UL (ref 0–0.2)
BASOPHILS NFR BLD AUTO: 0.7 % (ref 0–1)
DIFFERENTIAL METHOD BLD: ABNORMAL
EOSINOPHIL # BLD AUTO: 0.17 K/UL (ref 0–0.5)
EOSINOPHIL NFR BLD AUTO: 2 % (ref 1–4)
ERYTHROCYTE [DISTWIDTH] IN BLOOD BY AUTOMATED COUNT: 15.7 % (ref 11.5–14.5)
HCT VFR BLD AUTO: 50.5 % (ref 40–54)
HGB BLD-MCNC: 15.5 G/DL (ref 13.5–18)
IMM GRANULOCYTES # BLD AUTO: 0.04 K/UL (ref 0–0.04)
IMM GRANULOCYTES NFR BLD: 0.5 % (ref 0–0.4)
LYMPHOCYTES # BLD AUTO: 2.35 K/UL (ref 1–4.8)
LYMPHOCYTES NFR BLD AUTO: 27.6 % (ref 27–41)
MCH RBC QN AUTO: 24.6 PG (ref 27–31)
MCHC RBC AUTO-ENTMCNC: 30.7 G/DL (ref 32–36)
MCV RBC AUTO: 80.3 FL (ref 80–96)
MONOCYTES # BLD AUTO: 0.57 K/UL (ref 0–0.8)
MONOCYTES NFR BLD AUTO: 6.7 % (ref 2–6)
MPC BLD CALC-MCNC: ABNORMAL G/DL
NEUTROPHILS # BLD AUTO: 5.33 K/UL (ref 1.8–7.7)
NEUTROPHILS NFR BLD AUTO: 62.5 % (ref 53–65)
NRBC # BLD AUTO: 0 X10E3/UL
NRBC, AUTO (.00): 0 %
PLATELET # BLD AUTO: 112 K/UL (ref 150–400)
RBC # BLD AUTO: 6.29 M/UL (ref 4.6–6.2)
WBC # BLD AUTO: 8.52 K/UL (ref 4.5–11)

## 2025-05-28 NOTE — PROGRESS NOTES
Lab result reviewed and discussed with the patient over the phone call. Have elevated Triglyceride of 329 ( from 170). Is on Atorvastatin and Fenofibrate but endorses of not taking medicine regularly. Educated regarding proper intake of medicine and good dietary habits.  Other labs- Improvement in HbA1C level, from 8.7 to 6.6. HIV and Hep C antibody-not reactive. CBC and CMP- not significant changes.

## 2025-06-18 DIAGNOSIS — I25.10 CORONARY ARTERY DISEASE INVOLVING NATIVE CORONARY ARTERY OF NATIVE HEART WITHOUT ANGINA PECTORIS: Primary | ICD-10-CM

## 2025-06-18 RX ORDER — ATORVASTATIN CALCIUM 40 MG/1
40 TABLET, FILM COATED ORAL DAILY
Qty: 30 TABLET | Refills: 3 | Status: SHIPPED | OUTPATIENT
Start: 2025-06-18 | End: 2025-10-16

## 2025-07-07 ENCOUNTER — OFFICE VISIT (OUTPATIENT)
Dept: FAMILY MEDICINE | Facility: CLINIC | Age: 60
End: 2025-07-07

## 2025-07-07 ENCOUNTER — PATIENT OUTREACH (OUTPATIENT)
Facility: HOSPITAL | Age: 60
End: 2025-07-07

## 2025-07-07 VITALS
WEIGHT: 260 LBS | DIASTOLIC BLOOD PRESSURE: 82 MMHG | TEMPERATURE: 98 F | HEIGHT: 75 IN | SYSTOLIC BLOOD PRESSURE: 124 MMHG | BODY MASS INDEX: 32.33 KG/M2 | OXYGEN SATURATION: 95 % | RESPIRATION RATE: 18 BRPM | HEART RATE: 98 BPM

## 2025-07-07 DIAGNOSIS — I10 ESSENTIAL HYPERTENSION: ICD-10-CM

## 2025-07-07 DIAGNOSIS — E78.1 HYPERTRIGLYCERIDEMIA: Primary | ICD-10-CM

## 2025-07-07 DIAGNOSIS — E11.51 TYPE 2 DIABETES MELLITUS WITH DIABETIC PERIPHERAL ANGIOPATHY WITHOUT GANGRENE, WITHOUT LONG-TERM CURRENT USE OF INSULIN: ICD-10-CM

## 2025-07-07 PROCEDURE — 99212 OFFICE O/P EST SF 10 MIN: CPT | Mod: GC,,, | Performed by: FAMILY MEDICINE

## 2025-07-07 RX ORDER — FENOFIBRATE 160 MG/1
160 TABLET ORAL DAILY
Qty: 30 TABLET | Refills: 2 | Status: SHIPPED | OUTPATIENT
Start: 2025-07-07 | End: 2025-10-05

## 2025-07-07 RX ORDER — GLIMEPIRIDE 1 MG/1
1 TABLET ORAL
Qty: 30 TABLET | Refills: 2 | Status: SHIPPED | OUTPATIENT
Start: 2025-07-07 | End: 2025-10-05

## 2025-07-07 NOTE — ASSESSMENT & PLAN NOTE
Assessment:  Increase in  triglycerides from 170 to 339 on recent labs. Prior lab showed decrease in triglycerides from 888 to 170. Reports not taking fenofibrate.  Plan:  Dietary benefits were discussed with patient. Pt to monitor diet and increase exercise  Reorder fenofibrate  Continue OTC fish oil  Patient to monitor for signs of myopathy (muscle weakness, stiffness, cramping); given that he is also on statin.  Pt to discontinue fenofibrate if develop symptoms of myopathy

## 2025-07-07 NOTE — PROGRESS NOTES
Population Health Chart Review & Patient Outreach Details      Further Action Needed If Patient Returns Outreach:        Health Maintenance Due   Topic Date Due    Diabetic Eye Exam  Never done    TETANUS VACCINE  Never done    Pneumococcal Vaccines (Age 50+) (1 of 2 - PCV) Never done    Colorectal Cancer Screening  Never done    Shingles Vaccine (1 of 2) Never done    COVID-19 Vaccine ( season) Never done          Updates Requested / Reviewed:     [x]  Care Everywhere    [x]     []  External Sources ([facility], [facility], DIS, etc.)    [] [facility]   [] [facility]   [] Other:    [x]  Care Team Updated   []  Removed  or Duplicate Orders   []  Immunization Reconciliation Completed / Queried    [] [place]   [] [place]   [] [place]   [] [place]      Health Maintenance Topics Addressed and Outreach Outcomes / Actions Taken:             Breast Cancer Screening []  Mammogram Order Placed    []  Mammogram Screening Scheduled    []  External Records Requested & Care Team Updated if Applicable    []  External Records Uploaded & Care Team Updated if Applicable    []  Pt Declined Scheduling Mammogram    []  Pt Will Schedule with External Provider / Order Routed & Care Team Updated if Applicable              Cervical Cancer Screening []  Pap Smear Scheduled in Primary Care or OBGYN    []  External Records Requested & Care Team Updated if Applicable       []  External Records Uploaded, Care Team Updated, & History Updated if Applicable    []  Patient Declined Scheduling Pap Smear    []  Patient Will Schedule with External Provider & Care Team Updated if Applicable                  Colorectal Cancer Screening []  Colonoscopy Case Request / Referral / Home Test Order Placed    []  External Records Requested & Care Team Updated if Applicable    []  External Records Uploaded, Care Team Updated, & History Updated if Applicable    []  Patient Declined Completing Colon Cancer Screening    []  Patient Will Schedule  with External Provider & Care Team Updated if Applicable    []  Fit Kit Mailed (add the SmartPhrase under additional notes)    []  Reminded Patient to Complete Home Test                Diabetic Eye Exam []  Eye Exam Screening Order Placed    []  Eye Camera Scheduled or Optometry/Ophthalmology Referral Placed    [x]  External Records Requested & Care Team Updated if Applicable    []  External Records Uploaded, Care Team Updated, & History Updated if Applicable    []  Patient Declined Scheduling Eye Exam    []  Patient Will Schedule with External Provider & Care Team Updated if Applicable             Blood Pressure Control []  Primary Care Follow Up Visit Scheduled     []  Remote Blood Pressure Reading Captured    []  Patient Declined Remote Reading or Scheduling Appt - Escalated to PCP    []  Patient Will Call Back or Send Portal Message with Reading                 HbA1c & Other Labs []  Overdue Lab(s) Ordered    []  Overdue Lab(s) Scheduled    []  External Records Uploaded & Care Team Updated if Applicable    []  Primary Care Follow Up Visit Scheduled     []  Reminded Patient to Complete A1c Home Test    []  Patient Declined Scheduling Labs or Will Call Back to Schedule    []  Patient Will Schedule with External Provider / Order Routed, & Care Team Updated if Applicable           Primary Care Appointment []  Primary Care Appt Scheduled    []  Patient Declined Scheduling or Will Call Back to Schedule    []  Pt Established with External Provider, Updated Care Team, & Informed Pt to Notify Payor if Applicable           Medication Adherence /    Statin Use []  Primary Care Appointment Scheduled    []  Patient Reminded to  Prescription    []  Patient Declined, Provider Notified if Needed    []  Sent Provider Message to Review to Evaluate Pt for Statin, Add Exclusion Dx Codes, Document   Exclusion in Problem List, Change Statin Intensity Level to Moderate or High Intensity if Applicable                Osteoporosis  Screening []  Dexa Order Placed    []  Dexa Appointment Scheduled    []  External Records Requested & Care Team Updated    []  External Records Uploaded, Care Team Updated, & History Updated if Applicable    []  Patient Declined Scheduling Dexa or Will Call Back to Schedule    []  Patient Will Schedule with External Provider / Order Routed & Care Team Updated if Applicable       Additional Notes:

## 2025-07-07 NOTE — LETTER
AUTHORIZATION FOR RELEASE OF   CONFIDENTIAL INFORMATION    Dear Dr. Velasquez,    We are seeing Adam Parsons, date of birth 1965, in the clinic at [facility]. Marycarmen Hendricks MD is the patient's PCP. Adam Parsons has an outstanding lab/procedure at the time we reviewed his chart. In order to help keep his health information updated, he has authorized us to request the following medical record(s):        (  )  MAMMOGRAM                                      (  )  COLONOSCOPY      (  )  PAP SMEAR                                          (  )  OUTSIDE LAB RESULTS     (  )  DEXA SCAN                                          ( X )  EYE EXAM            (  )  FOOT EXAM                                          (  )  ENTIRE RECORD     (  )  OUTSIDE IMMUNIZATIONS                 (  )  _______________         Please fax records to Kathy Joya LPN, Care Coordinator at [phone].      If you have any questions, please call Kathy at [phone]          Patient Name: Adam Parsons  : 1965  Patient Phone #: [phone]        Adam Parsons  MRN: [id number]  : 1965  Age: 58 y.o.  Sex: male         Patient/Legal Guardian Signature  This signature was collected at 2024    Self     Self  _______________________________   Printed Name/Relationship to Patient      Consent for Examination and Treatment: I hereby authorize the providers and employees of [facility] ([facility]) to provide medical treatment/services which includes, but is not limited to, performing and administering tests and diagnostic procedures that are deemed necessary, including, but not limited to, imaging examinations, blood tests and other laboratory procedures as may be required by the hospital, clinic, or may be ordered by my physician(s) or persons working under the general and/or special instructions of my physician(s).      I understand and agree that this consent covers all authorized persons, including but not  limited to physicians, residents, nurse practitioners, physicians' assistants, specialists, consultants, student nurses, and independently contracted physicians, who are called upon by the physician in charge, to carry out the diagnostic procedures and medical or surgical treatment.     I hereby authorize [facility] to retain or dispose of any specimens or tissue, should there be such remaining from any test or procedure.     I hereby authorize and give consent for [facility] providers and employees to take photographs, images or videotapes of such diagnostic, surgical or treatment procedures of Patient as may be required by [facility] or as may be ordered by a physician. I further acknowledge and agree that [facility] may use cameras or other devices for patient monitoring.     I am aware that the practice of medicine is not an exact science, and I acknowledge that no guarantees have been made to me as to the outcome of any tests, procedures or treatment.     Authorization for Release of Information: I understand that my insurance company and/or their agents may need information necessary to make determinations about payment/reimbursement. I hereby provide authorization to release to all insurance companies, their successors, assignees, other parties with whom they may have contracted, or others acting on their behalf, that are involved with payment for any hospital and/or clinic charges incurred by the patient, any information that they request and deem necessary for payment/reimbursement, and/or quality review.  I further authorize the release of my health information to physicians or other health care practitioners on staff who are involved in my health care now and in the future, and to other health care providers, entities, or institutions for the purpose of my continued care and treatment, including referrals.     REGISTRATION AUTHORIZATION  Form No. 41762 (Rev. 3/25/2024)    Page 1 of 3                       Medicare  Patient's Certification and Authorization to Release Information and Payment Request:  I certify that the information given by me in applying for payment under Title XVIII of the Social Security Act is correct. I authorize any wilkerson of medical or other information about me to release to the Social SecurityAdministration, or its intermediaries or carriers, any information needed for this or a related Medicare claim. I request that payment of authorized benefits be made on my behalf.     Assignment of Insurance Benefits:   I hereby authorize any and all insurance companies, health plans, defined   benefit plans, health insurers or any entity that is or may be responsible for payment of my medical expenses to pay all hospital and medical benefits now due, and to become due and payable to me under any hospital benefits, sick benefits, injury benefits or any other benefit for services rendered to me, including Major Medical Benefits, direct to [facility] and all independently contracted physicians. I assign any and all rights that I may have against any and all insurance companies, health plans, defined benefit plans, health insurers or any entity that is or may be responsible for payment of my medical expenses, including, but not limited to any right to appeal a denial of a claim, any right to bring any action, lawsuit, administrative proceeding, or other cause of action on my behalf. I specifically assign my right to pursue litigation against any and all insurance companies, health plans, defined benefit plans, health insurers or any entity that is or may be responsible for payment of my medical expenses based upon a refusal to pay charges.            E. Valuables: It is understood and agreed that [facility] is not liable for the damage to or loss of any money, jewelry,   documents, dentures, eye glasses, hearing aids, prosthetics, or other property of value.     F. Computer Equipment: I understand and agree that should I  choose to use computer equipment owned by [facility] or if I choose to access the Internet via [facility]s network, I do so at my own risk. [facility] is not responsible for any damage to my computer equipment or to any damages of any type that might arise from my loss of equipment or data.     G. Acceptance of Financial Responsibility:  I agree that in consideration of the services and   supplies that have been   or will be furnished to the patient, I am hereby obligated to pay all charges made for or on the account of the patient according to the standard rates (in effect at the time the services and supplies are delivered) established by [facility], including its Patient Financial Assistance Policy to the extent it is applicable. I understand that I am responsible for all charges, or portions thereof, not covered by insurance or other sources. Patient refunds will be distributed only after balances at all [facility] facilities are paid.     H. Communication Authorization:  I hereby authorize [facility] and its representatives, along with any billing service   or  who may work on their behalf, to contact me on   my cell phone and/or home phone using pre- recorded messages, artificial voice messages, automatic telephone dialing devices or other computer assisted technology, or by electronic      mail, text messaging, or by any other form of electronic communication. This includes, but is not limited to, appointment reminders, yearly physical exam reminders, preventive care reminders, patient campaigns, welcome calls, and calls about account balances on my account or any account on which I am listed as a guarantor. I understand I have the right to opt out of these communications at any time.      Relationship  Between  Facility and  Provider:      I understand that some, but not all, providers furnishing services to the patient are not employees or agents of [facility]. The patient is under the care and supervision of  his/her attending physician, and it is the responsibility of the facility and its nursing staff to carry out the instructions of such physicians. It is the responsibility of the patient's physician/designee to obtain the patient's informed consent, when required, for medical or surgical treatment, special diagnostic or therapeutic procedures, or hospital services rendered for the patient under the special instructions of the physician/designee.           REGISTRATION AUTHORIZATION  Form No. 67473 (Rev. 3/25/2024)    Page 2 of 3                       Immunizations: [facility] shares immunization information with state sponsored health departments to help you and your doctor keep track of your immunization records. By signing, you consent to have this information shared with the health department in your state:                                [place] - LINKS ([place] Immunization Network for Kids Statewide)                                [place] - MIIX ([place] Immunization Information eXchange)                                [place] - ImmPRINT (Immunization Patient Registry with Integrated Technology)     TERM: This authorization is valid for this and subsequent care/treatment I receive at [facility] and will remain valid unless/until revoked in writing by me.     [facility]: As used in this document, [facility] means all [facility] owned and managed facilities, including, but not limited to, all health centers, surgery centers, clinics, urgent care centers, and hospitals.         [facility] complies with applicable Federal civil rights laws and does not discriminate on the basis of race, color, national origin, age, disability, or sex.  ATENCIÓN: si habla español, tiene a white disposición servicios gratuitos de asistencia lingüística. Marilynn logan [phone].  CHÚ Ý: N?u b?n nói Ti?ng Vi?t, có các d?ch v? h? tr? ngôn ng? mi?n phí dành cho b?n. G?i s? [phone].        REGISTRATION  AUTHORIZATION  Form No. 88036 (Rev. 3/25/2024)   Page 3 of 3

## 2025-07-07 NOTE — ASSESSMENT & PLAN NOTE
Last Hba1c  was 6.6%; 1 months ago  Patient is on Metformin 500mg PO BID and Glimepiride 2mg PO once daily.  CMP 1 month ago showed BG of 170. Pt reports fluctuation in daily BP with episodes of hypoglycemia.  - Instructed to maintain healthy diet and regular exercise.  - Decrease Glimepiride from 2mg to 1mg daily, for better management of hypoglycemic episodes

## 2025-07-07 NOTE — ASSESSMENT & PLAN NOTE
BP today is controlled at 124/82 today . BP at last visit, a month ago was 164/103 mmHg. Patient reports fluctuation in BP at home.  Patient was counseled on importance of sticking to DASH diet and implementing exercise.  -Instructed to bring BP log and cuff to next visit  -Will check logs at next visit and access in clinic BP and adjust medications accordingly.  -Follow up in 1 month

## 2025-07-07 NOTE — Clinical Note
He had his diabetic eye exam at [facility] last month June 2025 needign it linked to his care gaps please ma'am AHD

## 2025-07-07 NOTE — PROGRESS NOTES
Marycarmen Hendricks MD    [address], [place] [address]  Phone: [phone]     Subjective     Name: Adam Parsons   YOB: 1965 (59 y.o.)  MRN: [id number]  Visit Date: 7/7/2025   Chief Complaint: Follow-up (X1 month follow up/Reports having no pain)        HISTORY OF PRESENT ILLNESS:  Patient is a 59-year-old male presenting for follow-up for abnormal lipid panel and hypertension. Lab results from last month revealed elevated triglycerides. Patient reports that he has not been taking fenofibrate but has been adherent to over-the-counter fish oil supplements. His blood glucose was also elevated on Labs; however, most recent HbA1c remains below 7%. Patient reports fluctuations in daily blood sugar,  with occasional episodes of hypoglycemia.  The importance of lifestyle modifications, including diet and exercise, was reviewed and emphasized during the visit. The patient is compliant with all other prescribed medications and states that he will begin taking fenofibrate as directed.          PAST MEDICAL HISTORY:  Significant Diagnoses - Patient  has a past medical history of Diabetes mellitus, type 2, Hyperlipidemia, Hypertension, and NSTEMI (non-ST elevated myocardial infarction) (08/02/2024).  Medications - Patient has a current medication list which includes the following long-term medication(s): aspirin, atorvastatin, carvedilol, clopidogrel, losartan, metformin, nitroglycerin, omega-3 fatty acids-fish oil, fenofibrate, and glimepiride.   Allergies - Patient has no known allergies.  Surgeries - Patient  has a past surgical history that includes ANGIOGRAM, CORONARY, WITH LEFT HEART CATHETERIZATION (N/A, 8/2/2024) and percutaneous coronary intervention, artery (N/A, 8/2/2024).  Family History - Patient family history includes Diabetes in his daughter and maternal uncle; Heart disease in his daughter and father.      SOCIAL HISTORY:  Tobacco - Patient  reports that he has quit smoking. His  "smoking use included cigarettes. He has never used smokeless tobacco.   Alcohol - Patient  reports that he does not currently use alcohol.   Recreational Drugs - Patient  reports no history of drug use.       Review of Systems   All other systems reviewed and are negative.         Past Medical History:   Diagnosis Date    Diabetes mellitus, type 2     Hyperlipidemia     Hypertension     NSTEMI (non-ST elevated myocardial infarction) 08/02/2024        Review of patient's allergies indicates:  No Known Allergies     Past Surgical History:   Procedure Laterality Date    ANGIOGRAM, CORONARY, WITH LEFT HEART CATHETERIZATION N/A 8/2/2024    Procedure: Angiogram, Coronary, with Left Heart Cath;  Surgeon: Blake Hodgson MD;  Location: [email or web address] CATH LAB;  Service: Cardiology;  Laterality: N/A;    PERCUTANEOUS CORONARY INTERVENTION, ARTERY N/A 8/2/2024    Procedure: Percutaneous coronary intervention;  Surgeon: Blake Hodgson MD;  Location: [email or web address] CATH LAB;  Service: Cardiology;  Laterality: N/A;        Family History   Problem Relation Name Age of Onset    Heart disease Father      Diabetes Daughter      Heart disease Daughter      Diabetes Maternal Uncle         Current Outpatient Medications   Medication Instructions    aspirin (ECOTRIN) 81 mg, Oral, Daily    atorvastatin (LIPITOR) 40 mg, Oral, Daily    carvediloL (COREG) 3.125 mg, Oral, 2 times daily with meals    clopidogreL (PLAVIX) 75 mg, Oral, Daily    fenofibrate 160 mg, Oral, Daily    glimepiride (AMARYL) 1 mg, Oral, Before breakfast    losartan (COZAAR) 100 mg, Oral    metFORMIN (GLUCOPHAGE) 500 mg, Oral, 2 times daily with meals    nitroGLYCERIN (NITROSTAT) 0.4 mg, Sublingual, Every 5 min PRN    omega-3 fatty acids-fish oil 340-1,000 mg Cap 1 capsule, Oral, Daily        Objective     /82 (BP Location: Left arm, Patient Position: Sitting)   Pulse 98   Temp 98 °F (36.7 °C) (Oral)   Resp 18   Ht 6' 3" (1.905 m)   Wt 117.9 kg (260 lb)   SpO2 " 95%   BMI 32.50 kg/m²     Physical Exam  Vitals reviewed.   Constitutional:       Appearance: He is obese.   HENT:      Head: Normocephalic and atraumatic.      Nose: Nose normal.      Mouth/Throat:      Mouth: Mucous membranes are moist.   Eyes:      Extraocular Movements: Extraocular movements intact.      Conjunctiva/sclera: Conjunctivae normal.   Cardiovascular:      Rate and Rhythm: Normal rate.      Pulses: Normal pulses.      Heart sounds: Normal heart sounds.   Pulmonary:      Effort: Pulmonary effort is normal.      Breath sounds: Normal breath sounds.   Abdominal:      General: Bowel sounds are normal.      Palpations: Abdomen is soft.   Musculoskeletal:         General: Normal range of motion.      Cervical back: Normal range of motion and neck supple.   Skin:     General: Skin is warm and dry.   Neurological:      Mental Status: He is alert and oriented to person, place, and time.   Psychiatric:         Mood and Affect: Mood normal.         Behavior: Behavior normal.          All recently obtained labs have been reviewed and discussed in detail with the patient.   Assessment     1. Hypertriglyceridemia    2. Type 2 diabetes mellitus with diabetic peripheral angiopathy without gangrene, without long-term current use of insulin    3. Essential hypertension         Plan     Problem List Items Addressed This Visit       Essential hypertension    BP today is controlled at 124/82 today . BP at last visit, a month ago was 164/103 mmHg. Patient reports fluctuation in BP at home.  Patient was counseled on importance of sticking to DASH diet and implementing exercise.  -Instructed to bring BP log and cuff to next visit  -Will check logs at next visit and access in clinic BP and adjust medications accordingly.  -Follow up in 1 month            Type 2 diabetes mellitus with circulatory disorder, without long-term current use of insulin    Last Hba1c  was 6.6%; 1 months ago  Patient is on Metformin 500mg PO BID and  Glimepiride 2mg PO once daily.  CMP 1 month ago showed BG of 170. Pt reports fluctuation in daily BP with episodes of hypoglycemia.  - Instructed to maintain healthy diet and regular exercise.  - Decrease Glimepiride from 2mg to 1mg daily, for better management of hypoglycemic episodes         Relevant Medications    glimepiride (AMARYL) 1 MG tablet    Hypertriglyceridemia - Primary    Assessment:  Increase in  triglycerides from 170 to 339 on recent labs. Prior lab showed decrease in triglycerides from 888 to 170. Reports not taking fenofibrate.  Plan:  Dietary benefits were discussed with patient. Pt to monitor diet and increase exercise  Reorder fenofibrate  Continue OTC fish oil  Patient to monitor for signs of myopathy (muscle weakness, stiffness, cramping); given that he is also on statin.  Pt to discontinue fenofibrate if develop symptoms of myopathy         Relevant Medications    fenofibrate 160 MG Tab         All orders:  Orders Placed This Encounter    fenofibrate 160 MG Tab    glimepiride (AMARYL) 1 MG tablet          No follow-ups on file.    Instructed patient that if symptoms fail to improve or worsen patient should seek immediate medical attention or report to the nearest emergency department. Patient expressed verbal agreement and understanding to this plan of care.   Marycarmen Hendricks MD  Family Medicine Residency PGY-1  [facility]

## 2025-07-11 ENCOUNTER — PATIENT OUTREACH (OUTPATIENT)
Facility: HOSPITAL | Age: 60
End: 2025-07-11

## 2025-07-11 NOTE — PROGRESS NOTES
Uploaded eye exam from [facility], repeat 1 year

## (undated) DEVICE — Device

## (undated) DEVICE — BOWL FLUID - BACK STOP

## (undated) DEVICE — DRAPE ANGIO BRACH 38X44IN

## (undated) DEVICE — KIT GLIDESHEATH NIT 6FR 10CM

## (undated) DEVICE — GLOVE SENSICARE PI SURG 7

## (undated) DEVICE — DRESSING TRANS 4X4 TEGADERM

## (undated) DEVICE — NDL PERCUTANEOUS 2.5CM 21G

## (undated) DEVICE — SET EXTENSION CLEARLINK 2INJ

## (undated) DEVICE — SET IV PRIMARY

## (undated) DEVICE — GUIDE LAUNCHER 6FR EBU 3.5

## (undated) DEVICE — PROTECTOR ULNAR NERVE FOAM

## (undated) DEVICE — CHLORAPREP 10.5 ML APPLICATOR

## (undated) DEVICE — GLOVE SENSICARE PI SURG 8

## (undated) DEVICE — ETCO2 NC MICROSTR FEM ST ADLT

## (undated) DEVICE — DEVICE BLUE DIAMOND INFL 20ML

## (undated) DEVICE — GUIDEWIRE VASC 3X190CM .014IN

## (undated) DEVICE — SCALPEL SZ 11 RETRACTABLE

## (undated) DEVICE — GUIDEWIRE J .035X260CM

## (undated) DEVICE — CONTRAST ISOVUE 370 100ML

## (undated) DEVICE — KIT CO-PILOT

## (undated) DEVICE — CATH NC EMERGE MR 3.5X20MM

## (undated) DEVICE — DECANTER FLUID TRNSF WHITE 9IN

## (undated) DEVICE — TR BAND REG

## (undated) DEVICE — CATH EMERGE MR 15 X 3.00